# Patient Record
Sex: FEMALE | Race: WHITE | NOT HISPANIC OR LATINO | Employment: STUDENT | URBAN - NONMETROPOLITAN AREA
[De-identification: names, ages, dates, MRNs, and addresses within clinical notes are randomized per-mention and may not be internally consistent; named-entity substitution may affect disease eponyms.]

---
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
## 2020-01-22 ENCOUNTER — OFFICE VISIT (OUTPATIENT)
Dept: PSYCHIATRY | Facility: CLINIC | Age: 20
End: 2020-01-22

## 2020-01-22 VITALS
BODY MASS INDEX: 21.49 KG/M2 | WEIGHT: 129 LBS | SYSTOLIC BLOOD PRESSURE: 103 MMHG | DIASTOLIC BLOOD PRESSURE: 66 MMHG | HEART RATE: 78 BPM | HEIGHT: 65 IN

## 2020-01-22 DIAGNOSIS — G47.9 SLEEPING DIFFICULTY: ICD-10-CM

## 2020-01-22 DIAGNOSIS — F43.12 CHRONIC POST-TRAUMATIC STRESS DISORDER (PTSD): ICD-10-CM

## 2020-01-22 DIAGNOSIS — F41.1 GENERALIZED ANXIETY DISORDER: ICD-10-CM

## 2020-01-22 DIAGNOSIS — F60.3 BORDERLINE PERSONALITY DISORDER (HCC): ICD-10-CM

## 2020-01-22 DIAGNOSIS — F33.0 MILD EPISODE OF RECURRENT MAJOR DEPRESSIVE DISORDER (HCC): Primary | ICD-10-CM

## 2020-01-22 PROCEDURE — 99214 OFFICE O/P EST MOD 30 MIN: CPT | Performed by: NURSE PRACTITIONER

## 2020-01-22 RX ORDER — LAMOTRIGINE 25 MG/1
25 TABLET ORAL DAILY
Qty: 30 TABLET | Refills: 0 | Status: SHIPPED | OUTPATIENT
Start: 2020-01-22 | End: 2020-02-12 | Stop reason: SDUPTHER

## 2020-01-22 RX ORDER — PRAZOSIN HYDROCHLORIDE 2 MG/1
2 CAPSULE ORAL NIGHTLY
Qty: 30 CAPSULE | Refills: 0 | Status: SHIPPED | OUTPATIENT
Start: 2020-01-22 | End: 2020-02-12 | Stop reason: SDUPTHER

## 2020-01-22 RX ORDER — BUSPIRONE HYDROCHLORIDE 10 MG/1
10 TABLET ORAL 2 TIMES DAILY
Qty: 60 TABLET | Refills: 0 | Status: SHIPPED | OUTPATIENT
Start: 2020-01-22 | End: 2020-02-12 | Stop reason: SDUPTHER

## 2020-01-22 NOTE — PROGRESS NOTES
"Subjective   Samantha Dubose is a 19 y.o. female who is here today for medication management follow up.    Chief Complaint: Anxiety and depression     History of Present Illness   Patient prefers to be called \"Michaela\". Presents today with her mother stating that she is still having difficulty with sleep.  When inquiring more about her sleep schedule patient states that she stays up all night watching TV Netflix and then will sleep 8 9 hours during the day in which she sleeps well.  Mother stated that she is tried multiple things to get her on a better sleep cycle as she has not been helpful for her doing chores around the home.  Suggested to mother and patient that she find a job working night shift so that way she can feel productive but when she is not having to get up in the mornings and do anything the sleep schedule can be altered.  Discussed proper sleep hygiene in detail.  Patient does not wish to elaborate on \"stuff\" that has been going on with her that has worsened her symptoms of anxiety and depression as well as irritability.  Patient's mother notes that she has had some depression and anxiety but she noticed it started getting worse in the last few weeks when she stated they were going to have to move and downsize that her home.  Patient states that she understands they have to move but does not want to. The patient reports depressive symptoms including depressed mood, crying spells, decreased appetite, anhedonia, feelings of guilt, feelings of hopelessness, low energy and difficulty concentrating, and have caused impairment in important areas of functioning.  Depression rated 7/10 with 10 being the worst. The patient reports the following symptoms of anxiety: constant anxiety/worry, restlessness/on edge, difficulty concentrating, irritability and anxiety causes distress/impairment in important areas of functioning and have caused impairment in important areas of functioning.  Patient reports her " anxiety has been a 7-8 on a scale 0-10 with 10 being the worst.  Mom states she has noticed that her mood has been fluctuating often.  Encouraged both of them that this is why we need medication to help with mood fluctuation as well as depression getting significantly high.  Patient also notes that she has not been taking the BuSpar on a regular schedule as she only takes as needed encouraged her that it would not work this way she has to take at least twice a day to keep her anxiety lower.  Patient denied any side effects to the current medication.  Patient states when she does sleep but she denies any nightmares.  Mother states that they did not follow-up with the PCP regarding lab work or sleep study encouraged them that she most likely did not need a sleep study more so it was her sleep routine and pattern.  Patient denied any SI or HI.  Patient denied any auditory or visual hallucinations.  Patient admits to thoughts of self-harm but denies any.  She reports that she is still going to see Deon for therapy which is helpful and plans on discussing some of her issues with him.        The following portions of the patient's history were reviewed and updated as appropriate: allergies, current medications, past family history, past medical history, past social history, past surgical history and problem list.    Review of Systems   Constitutional: Positive for appetite change.   Neurological: Negative for dizziness.   Psychiatric/Behavioral: Positive for agitation and dysphoric mood. Negative for behavioral problems, self-injury and sleep disturbance. The patient is nervous/anxious.        Objective   Physical Exam   Constitutional: She appears well-developed and well-nourished.   Psychiatric: Her speech is normal. Thought content normal. Her mood appears anxious. Her affect is not angry. She is agitated. Cognition and memory are normal. She expresses impulsivity. She exhibits a depressed mood. She is attentive.  "  Vitals reviewed.    Blood pressure 103/66, pulse 78, height 165.1 cm (65\"), weight 58.5 kg (129 lb). Body mass index is 21.47 kg/m².      No Known Allergies    Current Medications:   Current Outpatient Medications   Medication Sig Dispense Refill   • busPIRone (BUSPAR) 10 MG tablet Take 1 tablet by mouth 2 (Two) Times a Day. 60 tablet 0   • levonorgestrel-ethinyl estradiol (LESSINA) 0.1-20 MG-MCG per tablet Take 1 tablet by mouth Daily.     • prazosin (MINIPRESS) 2 MG capsule Take 1 capsule by mouth Every Night. 30 capsule 0   • lamoTRIgine (LAMICTAL) 25 MG tablet Take 1 tablet by mouth Daily. 30 tablet 0     No current facility-administered medications for this visit.        Mental Status Exam:   Hygiene:   good  Cooperation: Cooperative  Eye Contact: Good  Psychomotor Behavior: Agitated   Affect:  Full range  Hopelessness: Denies  Speech:  Normal  Thought Process:  Goal directed  Thought Content:  Mood congruent   Suicidal:  None  Homicidal:  None  Hallucinations:  None  Delusion:  None  Memory:  Intact  Orientation:  Person, Place, Time and Situation  Reliability:  fair  Insight:  Fair  Judgement:  Fair  Impulse Control:  Fair  Physical/Medical Issues:  No     Assessment/Plan   Diagnoses and all orders for this visit:    Mild episode of recurrent major depressive disorder (CMS/HCC)  -     lamoTRIgine (LAMICTAL) 25 MG tablet; Take 1 tablet by mouth Daily.    Generalized anxiety disorder  -     busPIRone (BUSPAR) 10 MG tablet; Take 1 tablet by mouth 2 (Two) Times a Day.  -     lamoTRIgine (LAMICTAL) 25 MG tablet; Take 1 tablet by mouth Daily.    Sleeping difficulty    Chronic post-traumatic stress disorder (PTSD)  -     prazosin (MINIPRESS) 2 MG capsule; Take 1 capsule by mouth Every Night.  -     lamoTRIgine (LAMICTAL) 25 MG tablet; Take 1 tablet by mouth Daily.    Borderline personality disorder (CMS/HCC)  -     lamoTRIgine (LAMICTAL) 25 MG tablet; Take 1 tablet by mouth Daily.        I spent a total of   " 25 minutes in direct patient care, greater than  15 minutes (greater than 50%) were spent in coordination of care, and counseling the patient regarding depression and anxiety as well as the importance of medication adherence. Answered any questions patient had with medication and plan.   Discussed medications options as well as any side effects in detail with the patient and mother.  Also discussed proper sleep hygiene and sleeping habits in detail with the patient.  Encouraged mom to follow-up with PCP for a checkup as well as lab work even though the patient refuses.    Patient stated that she was willing to try medication for her fluctuation in mood and depression.  Begin lamotrigine 25 mg daily for adjunct for depression as well as mood fluctuation. Continue Minipress 2 mg at night for nightmares.  Continue BuSpar 10 mg twice daily for anxiety, highly encouraged the patient that she will have to take twice a day to note effectiveness.  Discussed the risks, beneefits, and side effects of the medications; patient ackowledged and verbally consented.  Patient is aware to call the [facility] with any worsening of symptoms.  Patient is agreeable to call 911 or go to the nearest ER should he/she begin having SI/HI. Patient was strongly encouraged to continue birth control.  Patient was counseled regarding need not to become pregnant prior to discussion and possible titration and discontinuation of medications.  An explanation was provided to the patient regarding the risk of fetal harm with psychotrophic medications.  Patient was provided education regarding both risk of continuing and discontinuing medications during pregnancy.  Patient verbalized understanding. We will add Lamictal in an effort to stabilize mood.  The patient was reminded to immediately come to the hospital should there be any loss of control.  Explanation was given to her regarding Lamictal and the potential for Dylan Stoney syndrome and  significant rash.  Patient was encouraged to check skin prior to beginning.  Patient was encouraged to report any rash and to immediately stop medication.      Prognosis: Guarded dependent on medication/follow up and treatment plan compliance.   Functionality: pt having significant impairment in important areas of daily functioning regarding depression and anxiety will adjust medications and add additional medications and follow over the next 3 to 4 weeks.  Patient will follow-up in 3-4 weeks, encourage patient and the mother that if they had any questions or concerns or worsening depressive symptoms to contact the [facility] for sooner appointment both agreed.    Errors in dictation may reflect use of voice recognition software and not all errors in transcription may have been detected prior to signing.

## 2020-02-03 DIAGNOSIS — F41.1 GENERALIZED ANXIETY DISORDER: ICD-10-CM

## 2020-02-03 RX ORDER — BUSPIRONE HYDROCHLORIDE 10 MG/1
TABLET ORAL
Qty: 60 TABLET | Refills: 1 | OUTPATIENT
Start: 2020-02-03

## 2020-02-12 ENCOUNTER — OFFICE VISIT (OUTPATIENT)
Dept: PSYCHIATRY | Facility: CLINIC | Age: 20
End: 2020-02-12

## 2020-02-12 VITALS
HEART RATE: 82 BPM | BODY MASS INDEX: 21.56 KG/M2 | SYSTOLIC BLOOD PRESSURE: 112 MMHG | WEIGHT: 129.4 LBS | HEIGHT: 65 IN | DIASTOLIC BLOOD PRESSURE: 77 MMHG

## 2020-02-12 DIAGNOSIS — F43.12 CHRONIC POST-TRAUMATIC STRESS DISORDER (PTSD): ICD-10-CM

## 2020-02-12 DIAGNOSIS — F33.0 MILD EPISODE OF RECURRENT MAJOR DEPRESSIVE DISORDER (HCC): ICD-10-CM

## 2020-02-12 DIAGNOSIS — F60.3 BORDERLINE PERSONALITY DISORDER (HCC): ICD-10-CM

## 2020-02-12 DIAGNOSIS — F41.1 GENERALIZED ANXIETY DISORDER: ICD-10-CM

## 2020-02-12 PROCEDURE — 99213 OFFICE O/P EST LOW 20 MIN: CPT | Performed by: NURSE PRACTITIONER

## 2020-02-12 RX ORDER — BUSPIRONE HYDROCHLORIDE 10 MG/1
10 TABLET ORAL 2 TIMES DAILY
Qty: 60 TABLET | Refills: 2 | Status: SHIPPED | OUTPATIENT
Start: 2020-02-12 | End: 2020-03-11 | Stop reason: SDUPTHER

## 2020-02-12 RX ORDER — PRAZOSIN HYDROCHLORIDE 2 MG/1
2 CAPSULE ORAL NIGHTLY
Qty: 30 CAPSULE | Refills: 2 | Status: SHIPPED | OUTPATIENT
Start: 2020-02-12 | End: 2020-03-11 | Stop reason: SDUPTHER

## 2020-02-12 RX ORDER — LAMOTRIGINE 25 MG/1
25 TABLET ORAL DAILY
Qty: 30 TABLET | Refills: 2 | Status: SHIPPED | OUTPATIENT
Start: 2020-02-12 | End: 2020-03-11 | Stop reason: SDUPTHER

## 2020-03-11 ENCOUNTER — OFFICE VISIT (OUTPATIENT)
Dept: PSYCHIATRY | Facility: CLINIC | Age: 20
End: 2020-03-11

## 2020-03-11 VITALS
DIASTOLIC BLOOD PRESSURE: 75 MMHG | WEIGHT: 139 LBS | HEART RATE: 96 BPM | SYSTOLIC BLOOD PRESSURE: 110 MMHG | HEIGHT: 65 IN | BODY MASS INDEX: 23.16 KG/M2

## 2020-03-11 DIAGNOSIS — Z79.899 MEDICATION MANAGEMENT: ICD-10-CM

## 2020-03-11 DIAGNOSIS — F33.0 MILD EPISODE OF RECURRENT MAJOR DEPRESSIVE DISORDER (HCC): ICD-10-CM

## 2020-03-11 DIAGNOSIS — F60.3 BORDERLINE PERSONALITY DISORDER (HCC): ICD-10-CM

## 2020-03-11 DIAGNOSIS — F43.12 CHRONIC POST-TRAUMATIC STRESS DISORDER (PTSD): ICD-10-CM

## 2020-03-11 DIAGNOSIS — F41.1 GENERALIZED ANXIETY DISORDER: Primary | ICD-10-CM

## 2020-03-11 DIAGNOSIS — G47.9 SLEEPING DIFFICULTY: ICD-10-CM

## 2020-03-11 LAB
AMPHETAMINE CUT-OFF: NORMAL
BENZODIAZIPINE CUT-OFF: NORMAL
BUPRENORPHINE CUT-OFF: NORMAL
COCAINE CUT-OFF: NORMAL
EXTERNAL AMPHETAMINE SCREEN URINE: NEGATIVE
EXTERNAL BENZODIAZEPINE SCREEN URINE: NEGATIVE
EXTERNAL BUPRENORPHINE SCREEN URINE: NEGATIVE
EXTERNAL COCAINE SCREEN URINE: NEGATIVE
EXTERNAL MDMA: NEGATIVE
EXTERNAL METHADONE SCREEN URINE: NEGATIVE
EXTERNAL METHAMPHETAMINE SCREEN URINE: NEGATIVE
EXTERNAL OPIATES SCREEN URINE: NEGATIVE
EXTERNAL OXYCODONE SCREEN URINE: NEGATIVE
EXTERNAL THC SCREEN URINE: NEGATIVE
MDMA CUT-OFF: NORMAL
METHADONE CUT-OFF: NORMAL
METHAMPHETAMINE CUT-OFF: NORMAL
OPIATES CUT-OFF: NORMAL
OXYCODONE CUT-OFF: NORMAL
THC CUT-OFF: NORMAL

## 2020-03-11 PROCEDURE — 90792 PSYCH DIAG EVAL W/MED SRVCS: CPT | Performed by: NURSE PRACTITIONER

## 2020-03-11 RX ORDER — TRAZODONE HYDROCHLORIDE 50 MG/1
50 TABLET ORAL NIGHTLY PRN
Qty: 30 TABLET | Refills: 2 | Status: SHIPPED | OUTPATIENT
Start: 2020-03-11 | End: 2020-04-30

## 2020-03-11 RX ORDER — HYDROXYZINE PAMOATE 50 MG/1
50 CAPSULE ORAL NIGHTLY PRN
Qty: 30 CAPSULE | Refills: 2 | Status: SHIPPED | OUTPATIENT
Start: 2020-03-11 | End: 2020-04-30

## 2020-03-11 RX ORDER — PRAZOSIN HYDROCHLORIDE 2 MG/1
2 CAPSULE ORAL NIGHTLY
Qty: 30 CAPSULE | Refills: 2 | Status: SHIPPED | OUTPATIENT
Start: 2020-03-11 | End: 2020-05-14

## 2020-03-11 RX ORDER — LAMOTRIGINE 25 MG/1
25 TABLET ORAL DAILY
Qty: 30 TABLET | Refills: 2 | Status: SHIPPED | OUTPATIENT
Start: 2020-03-11 | End: 2020-04-30 | Stop reason: SDUPTHER

## 2020-03-11 RX ORDER — BUSPIRONE HYDROCHLORIDE 15 MG/1
15 TABLET ORAL 2 TIMES DAILY
Qty: 60 TABLET | Refills: 2 | Status: SHIPPED | OUTPATIENT
Start: 2020-03-11 | End: 2020-04-30 | Stop reason: SDUPTHER
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
## 2020-04-30 ENCOUNTER — TELEMEDICINE (OUTPATIENT)
Dept: PSYCHIATRY | Facility: CLINIC | Age: 20
End: 2020-04-30

## 2020-04-30 DIAGNOSIS — F33.0 MILD EPISODE OF RECURRENT MAJOR DEPRESSIVE DISORDER (HCC): ICD-10-CM

## 2020-04-30 DIAGNOSIS — F41.1 GENERALIZED ANXIETY DISORDER: Primary | ICD-10-CM

## 2020-04-30 DIAGNOSIS — F43.12 CHRONIC POST-TRAUMATIC STRESS DISORDER (PTSD): ICD-10-CM

## 2020-04-30 DIAGNOSIS — F60.3 BORDERLINE PERSONALITY DISORDER (HCC): ICD-10-CM

## 2020-04-30 PROCEDURE — 99214 OFFICE O/P EST MOD 30 MIN: CPT | Performed by: NURSE PRACTITIONER

## 2020-04-30 RX ORDER — LAMOTRIGINE 25 MG/1
50 TABLET ORAL DAILY
Qty: 60 TABLET | Refills: 1 | Status: SHIPPED | OUTPATIENT
Start: 2020-04-30 | End: 2020-05-28 | Stop reason: SDUPTHER

## 2020-04-30 RX ORDER — BUSPIRONE HYDROCHLORIDE 10 MG/1
10 TABLET ORAL 2 TIMES DAILY
Qty: 60 TABLET | Refills: 1 | Status: SHIPPED | OUTPATIENT
Start: 2020-04-30 | End: 2020-05-28 | Stop reason: SDUPTHER

## 2020-04-30 NOTE — TREATMENT PLAN
Multi-Disciplinary Problems (from Behavioral Health Treatment Plan)    Active Problems     Problem: Anxiety  Start Date: 04/30/20    Problem Details:  The patient self-scales this problem as a 5 with 10 being the worst.       Goal Priority Start Date Expected End Date End Date    Patient will develop and implement behavioral and cognitive strategies to reduce anxiety and irrational fears. -- 04/30/20 -- --    Goal Details:  Progress toward goal:  The patient self-scales their progress related to this goal as a 5 with 10 being the worst.       Goal Intervention Frequency Start Date End Date    Help patient explore past emotional issues in relation to present anxiety. Weekly 04/30/20 --    Intervention Details:  Duration of treatment until until remission of symptoms.       Goal Intervention Frequency Start Date End Date    Help patient develop an awareness of their cognitive and physical responses to anxiety. Q Month 04/30/20 --    Intervention Details:  Duration of treatment until until remission of symptoms.             Problem: Depression  Start Date: 04/30/20    Problem Details:  The patient self-scales this problem as a 0 with 10 being the worst.       Goal Priority Start Date Expected End Date End Date    Patient will demonstrate the ability to initiate new constructive life skills outside of sessions on a consistent basis. -- 04/30/20 -- --    Goal Details:  Progress toward goal:  The patient self-scales their progress related to this goal as a 0 with 10 being the worst.       Goal Intervention Frequency Start Date End Date    Assist patient in setting attainable activities of daily living goals. PRN 04/30/20 --    Goal Intervention Frequency Start Date End Date    Provide education about depression Weekly 04/30/20 --    Intervention Details:  Duration of treatment until until discharged.       Goal Intervention Frequency Start Date End Date    Assist patient in developing healthy coping strategies. Weekly  04/30/20 --    Intervention Details:  Duration of treatment until until discharged.                          I have discussed and reviewed this treatment plan with the patient.

## 2020-04-30 NOTE — PROGRESS NOTES
Minerva Dubose is a 20 y.o. female who is being seen for medication management today.     This provider is located at [place] [address]. The Patient is seen remotely at home, using [employer]. Patient is being seen via telehealth and confirm that they are in a secure environment for this session. The patient's condition being diagnosed/treated is appropriate for telemedicine. The provider identified himself/herself: herself as well as her credentials.   The mother and patient gave consent to be seen remotely, and when consent is given they understand that the consent allows for patient identifiable information to be sent to a third party as needed.   They may refuse to be seen remotely at any time. The electronic data is encrypted and password protected, and the patient has been advised of the potential risks to privacy not withstanding such measures.    You have chosen to receive care through a telehealth visit.  Do you consent to use a video/audio connection for your medical care today? Yes        Chief Complaint: follow-up for depression anxiety and depression     History of Present Illness   Patient is being seen via my chart with her mother which she has consented and agreed with.  Patient states that she had been doing well until her last visit with another provider and she was placed on trazodone and hydroxyzine and started having hallucinations after a couple doses so discontinued them both but the hallucinations are still present.  Patient also states that her BuSpar was increased to 15 mg twice daily and she is unable to tolerate as it makes her heart racing out for early anxious so she can only take 1 dose but it does not help with her nighttime anxiety just during the day she cannot handle more than the 15 mg in 1 day.  Patient states that she has stopped the trazodone and hydroxyzine and is taking Percogestic which is a generic form of ZzzQuil which seems  to be helping with her sleep and contains acetaminophen and Benadryl.  Caution patient regarding overusing Tylenol and worried about her liver function and advised her not to take every night only as needed.  Patient denied any depression.  She states that on April 24 she got saved and plans on attending [employer] regularly as soon as the coronavirus is over with.  Patient states that her anxiety has been roughly a 4-5 and up and down at times on a scale 0-10 with 10 being the worst due to the fact that she is having hallucinations.  Patient states that she sees shadows as well as hears voices and they are muffled at times.  Patient states at times she is able to realize they are not real but other times she has to ask her mom due to that shadows being vivid for her according to the patient.  Patient notes that if it was not for the hallucinations she believes her anxiety would be slightly better and she would be doing well.  Patient reports that her mood has been good and she has not had any issues with irritability or agitation.  Patient notes that her appetite has been good.  Patient states that since taking the new medication for sleep she has been sleeping well with no issues.  Patient denies any self-harm or cutting.  Patient denies any SI.  Patient admits to auditory and visual hallucinations but states that they are not telling her to harm herself or anyone else.      The following portions of the patient's history were reviewed and updated as appropriate: allergies, current medications, past family history, past medical history, past social history, past surgical history and problem list.    Review of Systems   Constitutional: Negative for appetite change.   Neurological: Negative for dizziness.   Psychiatric/Behavioral: Positive for hallucinations. Negative for agitation, behavioral problems, dysphoric mood, self-injury and sleep disturbance. The patient is not nervous/anxious.        Objective   Physical  Exam   Constitutional: She appears well-developed and well-nourished.   Psychiatric: Her speech is normal and behavior is normal. Judgment and thought content normal. Her mood appears anxious. Her affect is not angry. She is actively hallucinating. She is not agitated. Cognition and memory are normal. She does not express impulsivity. She does not exhibit a depressed mood. She is attentive.     There were no vitals taken for this visit. There is no height or weight on file to calculate BMI.  Due to extenuating circumstances and possible current health risks associated with the patient being present in a clinical setting (with current health restrictions in place in regards to possible COVID 19 transmission/exposure), the patient was seen remotely today via a [employer] Video Visit through [employer].  Unable to obtain vital signs due to nature of remote visit.  Height stated at 65 inches.  Weight stated at 139 pounds.      No Known Allergies    Current Medications:   Current Outpatient Medications   Medication Sig Dispense Refill   • busPIRone (BUSPAR) 10 MG tablet Take 1 tablet by mouth 2 (Two) Times a Day. 60 tablet 1   • lamoTRIgine (LaMICtal) 25 MG tablet Take 2 tablets by mouth Daily. 60 tablet 1   • levonorgestrel-ethinyl estradiol (LESSINA) 0.1-20 MG-MCG per tablet Take 1 tablet by mouth Daily.     • prazosin (MINIPRESS) 2 MG capsule Take 1 capsule by mouth Every Night. 30 capsule 2     No current facility-administered medications for this visit.        Mental Status Exam:   Hygiene:   good  Cooperation: Cooperative  Eye Contact: Good  Psychomotor Behavior: Appropriate   Affect:  Full range  Hopelessness: Denies  Speech:  Normal  Thought Process:  Goal directed  Thought Content:  Mood congruent   Suicidal:  None  Homicidal:  None  Hallucinations:  Auditory and Visual   Delusion:  None  Memory:  Intact  Orientation:  Person, Place, Time and Situation  Reliability:  Good   Insight:  Fair  Judgement:  Fair  Impulse  Control:  Fair  Physical/Medical Issues:  No     Assessment/Plan   Diagnoses and all orders for this visit:    Generalized anxiety disorder  -     busPIRone (BUSPAR) 10 MG tablet; Take 1 tablet by mouth 2 (Two) Times a Day.  -     lamoTRIgine (LaMICtal) 25 MG tablet; Take 2 tablets by mouth Daily.    Chronic post-traumatic stress disorder (PTSD)  -     busPIRone (BUSPAR) 10 MG tablet; Take 1 tablet by mouth 2 (Two) Times a Day.  -     lamoTRIgine (LaMICtal) 25 MG tablet; Take 2 tablets by mouth Daily.    Mild episode of recurrent major depressive disorder (CMS/HCC)  -     lamoTRIgine (LaMICtal) 25 MG tablet; Take 2 tablets by mouth Daily.    Borderline personality disorder (CMS/HCC)  -     lamoTRIgine (LaMICtal) 25 MG tablet; Take 2 tablets by mouth Daily.        I spent a total of   25 minutes in direct patient care, greater than  15 minutes (greater than 50%) were spent in coordination of care, and counseling the patient regarding anxiety and hallucinations. Answered any questions patient had with medication and plan.  Patient denies any side effects or rash to the current medications.  Patient's only complaint at this time was her anxiety and hallucinations.  Praised patient for eating well as well as being active with an improved mood as mother notes she is doing well.      Increase lamotrigine to 50 mg daily for worsening hallucinations as well as depression and BPD.  Decrease BuSpar back to 10 mg twice a day as patient cannot tolerate at 15 mg twice a day as it was making her too anxious.  Continue Minipress 2 mg at night for nightmares. Advised patient to only take OTC sleep aides at night as needed due to avoiding excess amounts of Tylenol.  Discussed the risks, beneefits, and side effects of the medications; patient ackowledged and verbally consented.  Patient is aware to call the clinic with any worsening of symptoms.  Patient is agreeable to call 911 or go to the nearest ER should he/she begin having  SI/HI. Patient was strongly encouraged to continue birth control.  Patient was counseled regarding need not to become pregnant prior to discussion and possible titration and discontinuation of medications.  An explanation was provided to the patient regarding the risk of fetal harm with psychotrophic medications.  Patient was provided education regarding both risk of continuing and discontinuing medications during pregnancy.  Patient verbalized understanding. We will increase Lamictal in an effort to stabilize mood.  The patient was reminded to immediately come to the hospital should there be any loss of control.  Explanation was given to her regarding Lamictal and the potential for Dylan Stoney syndrome and significant rash.  Patient was encouraged to check skin prior to beginning.  Patient was encouraged to report any rash and to immediately stop medication.      Treatment Plan  Prognosis: Guarded dependent on medication/follow up and treatment plan compliance.   Functionality: pt showing improvements in important areas of daily functioning regarding her mood but struggling with hallucinations and anxiety will adjust medications and follow-up over the next weeks.   Patient will follow-up in 4 weeks, encourage patient and the mother that if they had any questions or concerns or worsening symptoms to contact the clinic both verbalized and agreed.     Short-term goals: Patient will adhere to medication regimen above and note a decrease in hallucinations over the next 4 weeks.  Long-term goals: Patient will continue with medication regimen and therapy and note an improvement in symptoms and daily functioning over the next 6 months.    Errors in dictation may reflect use of voice recognition software and not all errors in transcription may have been detected prior to signing.

## 2020-05-14 DIAGNOSIS — F43.12 CHRONIC POST-TRAUMATIC STRESS DISORDER (PTSD): ICD-10-CM

## 2020-05-14 RX ORDER — PRAZOSIN HYDROCHLORIDE 2 MG/1
CAPSULE ORAL
Qty: 30 CAPSULE | Refills: 2 | Status: SHIPPED | OUTPATIENT
Start: 2020-05-14 | End: 2020-05-28 | Stop reason: SDUPTHER

## 2020-05-28 ENCOUNTER — TELEMEDICINE (OUTPATIENT)
Dept: PSYCHIATRY | Facility: CLINIC | Age: 20
End: 2020-05-28

## 2020-05-28 DIAGNOSIS — F43.12 CHRONIC POST-TRAUMATIC STRESS DISORDER (PTSD): ICD-10-CM

## 2020-05-28 DIAGNOSIS — F41.1 GENERALIZED ANXIETY DISORDER: ICD-10-CM

## 2020-05-28 DIAGNOSIS — F33.0 MILD EPISODE OF RECURRENT MAJOR DEPRESSIVE DISORDER (HCC): ICD-10-CM

## 2020-05-28 DIAGNOSIS — F60.3 BORDERLINE PERSONALITY DISORDER (HCC): ICD-10-CM

## 2020-05-28 PROCEDURE — 99213 OFFICE O/P EST LOW 20 MIN: CPT | Performed by: NURSE PRACTITIONER

## 2020-05-28 RX ORDER — BUSPIRONE HYDROCHLORIDE 10 MG/1
10 TABLET ORAL 3 TIMES DAILY
Qty: 90 TABLET | Refills: 0 | Status: SHIPPED | OUTPATIENT
Start: 2020-05-28 | End: 2020-06-25 | Stop reason: SDUPTHER

## 2020-05-28 RX ORDER — PRAZOSIN HYDROCHLORIDE 2 MG/1
2 CAPSULE ORAL NIGHTLY
Qty: 30 CAPSULE | Refills: 0 | Status: SHIPPED | OUTPATIENT
Start: 2020-05-28 | End: 2020-06-25 | Stop reason: SDUPTHER

## 2020-05-28 RX ORDER — LAMOTRIGINE 25 MG/1
50 TABLET ORAL DAILY
Qty: 60 TABLET | Refills: 1 | Status: SHIPPED | OUTPATIENT
Start: 2020-05-28 | End: 2020-06-25 | Stop reason: SDUPTHER

## 2020-05-28 NOTE — PROGRESS NOTES
Minerva Dubose is a 20 y.o. female who is being seen for medication management today.     This provider is located at [address] [place] [address]. The Patient is seen remotely at home [address] [place] [address], using [employer]. Patient is being seen via telehealth and confirm that they are in a secure environment for this session. The patient's condition being diagnosed/treated is appropriate for telemedicine. The provider identified himself/herself: herself as well as her credentials.   The mother and patient gave consent to be seen remotely, and when consent is given they understand that the consent allows for patient identifiable information to be sent to a third party as needed.   They may refuse to be seen remotely at any time. The electronic data is encrypted and password protected, and the patient has been advised of the potential risks to privacy not withstanding such measures.    You have chosen to receive care through a telehealth visit.  Do you consent to use a video/audio connection for your medical care today? Yes        Chief Complaint: follow-up for hallucinations and sleep     History of Present Illness   Patient presents today for her my chart visit noting that the hallucinations have gone away as she is not having any more auditory she states occasionally her mind plays tricks on her visually but those are started going away as well.  Patient denies any side effects or rash to the current medications.  Patient states that she has been a little sad around the time of her menstrual cycle but other than that she has been doing well.  Patient noticed that she has been slightly anxious but stated that she had a fight with her older brother so that has been a little difficult.  Patient also notes that she has had more difficulty with sleep as she ran out of sleeping medicine and is going to try ZzzQuil which was effective for her in the past.   Patient states that some of the anxiety could be affecting her sleep as well as she has been taking them.  Patient denies any self-harm.  Patient does report dizziness on sporadic occasions encourage patient that this could be from the Minipress to make it stop if desired as patient stated she wanted to continue due to the fact that it does help with her nightmares.  Patient states that she is only getting roughly 6 or 7 hours of sleep at night she will go to sleep at 330 and wake up around 11 AM.  Patient denies any other significant issues.  Patient denies any SI or HI.  Patient denies any auditory or visual hallucinations.        The following portions of the patient's history were reviewed and updated as appropriate: allergies, current medications, past family history, past medical history, past social history, past surgical history and problem list.    Review of Systems   Constitutional: Negative for appetite change.   Neurological: Negative for dizziness.   Psychiatric/Behavioral: Positive for sleep disturbance. Negative for agitation, behavioral problems, dysphoric mood, hallucinations and self-injury. The patient is nervous/anxious.        Objective   Physical Exam   Constitutional: She appears well-developed and well-nourished.   Psychiatric: Her speech is normal and behavior is normal. Judgment and thought content normal. Her mood appears anxious. Her affect is not angry. She is not agitated and not actively hallucinating. Cognition and memory are normal. She does not express impulsivity. She does not exhibit a depressed mood. She is attentive.     There were no vitals taken for this visit. There is no height or weight on file to calculate BMI.  Due to extenuating circumstances and possible current health risks associated with the patient being present in a clinical setting (with current health restrictions in place in regards to possible COVID 19 transmission/exposure), the patient was seen remotely today  via a [employer] Video Visit through [employer].  Unable to obtain vital signs due to nature of remote visit.  Height stated at 65 inches.  Weight stated at 139 pounds.      No Known Allergies    Current Medications:   Current Outpatient Medications   Medication Sig Dispense Refill   • busPIRone (BUSPAR) 10 MG tablet Take 1 tablet by mouth 3 (Three) Times a Day. 90 tablet 0   • lamoTRIgine (LaMICtal) 25 MG tablet Take 2 tablets by mouth Daily. 60 tablet 1   • levonorgestrel-ethinyl estradiol (LESSINA) 0.1-20 MG-MCG per tablet Take 1 tablet by mouth Daily.     • prazosin (MINIPRESS) 2 MG capsule Take 1 capsule by mouth Every Night. 30 capsule 0     No current facility-administered medications for this visit.        Mental Status Exam:   Hygiene:   good  Cooperation: Cooperative  Eye Contact: Good  Psychomotor Behavior: Appropriate   Affect:  Full range  Hopelessness: Denies  Speech:  Normal  Thought Process:  Goal directed  Thought Content:  Mood congruent   Suicidal:  None  Homicidal:  None  Hallucinations:  None  Delusion:  None  Memory:  Intact  Orientation:  Person, Place, Time and Situation  Reliability:  Good   Insight:  Fair  Judgement:  Fair  Impulse Control:  Fair  Physical/Medical Issues:  No     Assessment/Plan   Diagnoses and all orders for this visit:    Generalized anxiety disorder  -     busPIRone (BUSPAR) 10 MG tablet; Take 1 tablet by mouth 3 (Three) Times a Day.  -     lamoTRIgine (LaMICtal) 25 MG tablet; Take 2 tablets by mouth Daily.    Chronic post-traumatic stress disorder (PTSD)  -     busPIRone (BUSPAR) 10 MG tablet; Take 1 tablet by mouth 3 (Three) Times a Day.  -     lamoTRIgine (LaMICtal) 25 MG tablet; Take 2 tablets by mouth Daily.  -     prazosin (MINIPRESS) 2 MG capsule; Take 1 capsule by mouth Every Night.    Mild episode of recurrent major depressive disorder (CMS/HCC)  -     lamoTRIgine (LaMICtal) 25 MG tablet; Take 2 tablets by mouth Daily.    Borderline personality disorder (CMS/HCC)  -      lamoTRIgine (LaMICtal) 25 MG tablet; Take 2 tablets by mouth Daily.      Discussed medications and any side effects as well as risk.  Answered any questions patient had with medication and plan.  Patient denies any side effects or rash to the current medications.  Patient's only complaint at this time was her anxiety and sleep.  Encourage patient to obtain her ZzzQuil as well as take melatonin 3 to 5 mg at night with the Seroquel to help with her sleep.      Continue lamotrigine to 50 mg daily for worsening hallucinations as well as depression and BPD.  Encouraged the patient that she can take 10 mg twice a day for anxiety that if in the middle of the day her anxiety is increased she can take 10mg, if tolerated.  Continue Minipress 2 mg at night for nightmares. Advised patient to only take OTC sleep aides at night as needed due to avoiding excess amounts of Tylenol.  Encourage patient that if the sleep aids were not effective or she had any worsening symptoms contact the clinic but also encourage proper sleep hygiene.  Form patient that if Minipress was causing the dizziness then we can discontinue but patient denied wanting to discontinue at this time given its effectiveness.  Discussed the risks, beneefits, and side effects of the medications; patient ackowledged and verbally consented.  Patient is aware to call the clinic with any worsening of symptoms.  Patient is agreeable to call 911 or go to the nearest ER should he/she begin having SI/HI. Patient was strongly encouraged to continue birth control.  Patient was counseled regarding need not to become pregnant prior to discussion and possible titration and discontinuation of medications.  An explanation was provided to the patient regarding the risk of fetal harm with psychotrophic medications.  Patient was provided education regarding both risk of continuing and discontinuing medications during pregnancy.  Patient verbalized understanding. We will continue  Lamictal in an effort to stabilize mood.  The patient was reminded to immediately come to the hospital should there be any loss of control.  Explanation was given to her regarding Lamictal and the potential for Dylan Stoney syndrome and significant rash.  Patient was encouraged to check skin prior to beginning.  Patient was encouraged to report any rash and to immediately stop medication.      Treatment Plan  Prognosis: Guarded dependent on medication/follow up and treatment plan compliance.   Functionality: pt showing improvements in important areas of daily functioning regarding her mood but struggling with sleep and anxiety will adjust medications and follow-up over the next 4 weeks.   Patient will follow-up in 4 weeks, encourage patient and the mother that if they had any questions or concerns or worsening symptoms to contact the clinic both verbalized and agreed.     Short-term goals: Patient will adhere to medication regimen above and note a decrease in anxiety over the next 4 weeks.  Long-term goals: Patient will continue with medication regimen and therapy and note an improvement in symptoms and daily functioning over the next 6 months.    Errors in dictation may reflect use of voice recognition software and not all errors in transcription may have been detected prior to signing.

## 2020-06-25 ENCOUNTER — TELEMEDICINE (OUTPATIENT)
Dept: PSYCHIATRY | Facility: CLINIC | Age: 20
End: 2020-06-25

## 2020-06-25 DIAGNOSIS — F33.0 MILD EPISODE OF RECURRENT MAJOR DEPRESSIVE DISORDER (HCC): ICD-10-CM

## 2020-06-25 DIAGNOSIS — G47.9 SLEEPING DIFFICULTY: ICD-10-CM

## 2020-06-25 DIAGNOSIS — F41.1 GENERALIZED ANXIETY DISORDER: Primary | ICD-10-CM

## 2020-06-25 DIAGNOSIS — F60.3 BORDERLINE PERSONALITY DISORDER (HCC): ICD-10-CM

## 2020-06-25 DIAGNOSIS — F43.12 CHRONIC POST-TRAUMATIC STRESS DISORDER (PTSD): ICD-10-CM

## 2020-06-25 PROCEDURE — 99213 OFFICE O/P EST LOW 20 MIN: CPT | Performed by: NURSE PRACTITIONER

## 2020-06-25 RX ORDER — PRAZOSIN HYDROCHLORIDE 2 MG/1
2 CAPSULE ORAL NIGHTLY
Qty: 30 CAPSULE | Refills: 1 | Status: SHIPPED | OUTPATIENT
Start: 2020-06-25 | End: 2020-08-17 | Stop reason: SDUPTHER

## 2020-06-25 RX ORDER — BUSPIRONE HYDROCHLORIDE 10 MG/1
10 TABLET ORAL 3 TIMES DAILY
Qty: 90 TABLET | Refills: 1 | Status: SHIPPED | OUTPATIENT
Start: 2020-06-25 | End: 2020-08-17 | Stop reason: SDUPTHER

## 2020-06-25 RX ORDER — LAMOTRIGINE 25 MG/1
50 TABLET ORAL DAILY
Qty: 60 TABLET | Refills: 1 | Status: SHIPPED | OUTPATIENT
Start: 2020-06-25 | End: 2020-08-06

## 2020-06-25 NOTE — PROGRESS NOTES
Subjective   Samantha Dubose is a 20 y.o. female who is being seen for medication management today.     This provider is located at [facility], [address] [place] [address], [place] [address]. The Patient is seen remotely at [place] [address] [place] [address], using [employer]. Patient is being seen via telehealth and confirm that they are in a secure environment for this session. The patient's condition being diagnosed/treated is appropriate for telemedicine. The provider identified himself/herself: herself as well as her credentials.   The mother and patient gave consent to be seen remotely, and when consent is given they understand that the consent allows for patient identifiable information to be sent to a third party as needed.   They may refuse to be seen remotely at any time. The electronic data is encrypted and password protected, and the patient has been advised of the potential risks to privacy not withstanding such measures.    You have chosen to receive care through a telehealth visit.  Do you consent to use a video/audio connection for your medical care today? Yes        Chief Complaint: follow-up for mood    History of Present Illness   Patient is seen via my chart today and is accompanied by her mother whom she has consented and agreed with to be present.  Patient reports that she has been doing good but she is just tired from last night as she was having stomach issues that were isolated.  Patient denies any depressive symptoms.  Patient reports that her anxiety does get elevated at times and she has to take her medication but for the most part she is doing well.  Patient reports that her appetite has been good.  Patient states that with the melatonin and her other sleep medications that she is doing good and sleeping well.  Patient denied any rash or side effects to the medication.  Spoke with mother and she states overall the patient is doing good except for some  anxiety but otherwise managing.  Patient and mother denied any other symptoms or side effects.  Patient denies any SI/HI/AH/VH.       The following portions of the patient's history were reviewed and updated as appropriate: allergies, current medications, past family history, past medical history, past social history, past surgical history and problem list.    Review of Systems   Constitutional: Negative for appetite change.   Neurological: Negative for dizziness.   Psychiatric/Behavioral: Negative for agitation, behavioral problems, dysphoric mood, hallucinations, self-injury and sleep disturbance. The patient is nervous/anxious.    All other systems reviewed and are negative.      Objective   Physical Exam   Constitutional: She appears well-developed and well-nourished.   Psychiatric: Her speech is normal and behavior is normal. Judgment and thought content normal. Her mood appears anxious. Her affect is not angry. She is not agitated and not actively hallucinating. Cognition and memory are normal. She does not express impulsivity. She does not exhibit a depressed mood. She is attentive.     There were no vitals taken for this visit. There is no height or weight on file to calculate BMI.  Due to extenuating circumstances and possible current health risks associated with the patient being present in a clinical setting (with current health restrictions in place in regards to possible COVID 19 transmission/exposure), the patient was seen remotely today via a MyChart Video Visit through [employer].  Unable to obtain vital signs due to nature of remote visit.  Height stated at 65 inches.  Weight stated at 139 pounds.      No Known Allergies    Current Medications:   Current Outpatient Medications   Medication Sig Dispense Refill   • busPIRone (BUSPAR) 10 MG tablet Take 1 tablet by mouth 3 (Three) Times a Day. 90 tablet 1   • lamoTRIgine (LaMICtal) 25 MG tablet Take 2 tablets by mouth Daily. 60 tablet 1   •  levonorgestrel-ethinyl estradiol (LESSINA) 0.1-20 MG-MCG per tablet Take 1 tablet by mouth Daily.     • prazosin (MINIPRESS) 2 MG capsule Take 1 capsule by mouth Every Night. 30 capsule 1     No current facility-administered medications for this visit.        Mental Status Exam:   Hygiene:   good  Cooperation: Cooperative  Eye Contact: Good  Psychomotor Behavior: Appropriate   Affect:  Full range  Hopelessness: Denies  Speech:  Normal  Thought Process:  Goal directed  Thought Content:  Mood congruent   Suicidal:  None  Homicidal:  None  Hallucinations:  None  Delusion:  None  Memory:  Intact  Orientation:  Person, Place, Time and Situation  Reliability:  Good   Insight:  Fair  Judgement:  Fair  Impulse Control:  Fair  Physical/Medical Issues:  No     Assessment/Plan   Diagnoses and all orders for this visit:    Generalized anxiety disorder  -     busPIRone (BUSPAR) 10 MG tablet; Take 1 tablet by mouth 3 (Three) Times a Day.  -     lamoTRIgine (LaMICtal) 25 MG tablet; Take 2 tablets by mouth Daily.    Chronic post-traumatic stress disorder (PTSD)  -     busPIRone (BUSPAR) 10 MG tablet; Take 1 tablet by mouth 3 (Three) Times a Day.  -     lamoTRIgine (LaMICtal) 25 MG tablet; Take 2 tablets by mouth Daily.  -     prazosin (MINIPRESS) 2 MG capsule; Take 1 capsule by mouth Every Night.    Mild episode of recurrent major depressive disorder (CMS/HCC)  -     lamoTRIgine (LaMICtal) 25 MG tablet; Take 2 tablets by mouth Daily.    Borderline personality disorder (CMS/HCC)  -     lamoTRIgine (LaMICtal) 25 MG tablet; Take 2 tablets by mouth Daily.    Sleeping difficulty      Discussed medications and any side effects as well as risk.  Answered any questions patient had with medication and plan.  Patient denies any side effects or rash to the current medications.  Patient's only complaint at this time was her anxiety and sleep.      Continue lamotrigine  50 mg daily for depression and BPD.  Encouraged the patient that she can  take 10 mg twice a day for anxiety that if in the middle of the day her anxiety is increased she can take 10mg, if tolerated of the Buspar.  Continue Minipress 2 mg at night for nightmares. Advised patient to only take OTC sleep aides at night as needed due to avoiding excess amounts of Tylenol.   Informed the patient that if Minipress was causing the dizziness then we can discontinue but patient denied wanting to discontinue at this time given its effectiveness.  Discussed the risks, beneefits, and side effects of the medications; patient ackowledged and verbally consented.  Patient is aware to call the clinic with any worsening of symptoms.  Patient is agreeable to call 911 or go to the nearest ER should he/she begin having SI/HI. Patient was strongly encouraged to continue birth control.  Patient was counseled regarding need not to become pregnant prior to discussion and possible titration and discontinuation of medications.  An explanation was provided to the patient regarding the risk of fetal harm with psychotrophic medications.  Patient was provided education regarding both risk of continuing and discontinuing medications during pregnancy.  Patient verbalized understanding. We will continue Lamictal in an effort to stabilize mood.  The patient was reminded to immediately come to the hospital should there be any loss of control.  Explanation was given to her regarding Lamictal and the potential for Dylan Stoney syndrome and significant rash.  Patient was encouraged to check skin prior to beginning.  Patient was encouraged to report any rash and to immediately stop medication.       Treatment Plan  Prognosis: Guarded dependent on medication/follow up and treatment plan compliance.   Functionality: pt showing improvements in important areas of daily functioning.  Patient will follow-up in 8 weeks, encourage patient and the mother that if they had any questions or concerns or worsening symptoms to contact the  clinic both verbalized and agreed.     Short-term goals: Patient will adhere to medication regimen above and note a decrease in anxiety over the next 8 weeks.  Long-term goals: Patient will continue with medication regimen and therapy and note an improvement in symptoms and daily functioning over the next 6 months.    Errors in dictation may reflect use of voice recognition software and not all errors in transcription may have been detected prior to signing.

## 2020-08-06 DIAGNOSIS — F43.12 CHRONIC POST-TRAUMATIC STRESS DISORDER (PTSD): ICD-10-CM

## 2020-08-06 DIAGNOSIS — F60.3 BORDERLINE PERSONALITY DISORDER (HCC): ICD-10-CM

## 2020-08-06 DIAGNOSIS — F33.0 MILD EPISODE OF RECURRENT MAJOR DEPRESSIVE DISORDER (HCC): ICD-10-CM

## 2020-08-06 DIAGNOSIS — F41.1 GENERALIZED ANXIETY DISORDER: ICD-10-CM

## 2020-08-06 RX ORDER — LAMOTRIGINE 25 MG/1
50 TABLET ORAL DAILY
Qty: 60 TABLET | Refills: 0 | Status: SHIPPED | OUTPATIENT
Start: 2020-08-06 | End: 2020-08-17 | Stop reason: SDUPTHER

## 2020-08-17 ENCOUNTER — TELEMEDICINE (OUTPATIENT)
Dept: PSYCHIATRY | Facility: CLINIC | Age: 20
End: 2020-08-17

## 2020-08-17 DIAGNOSIS — F41.1 GENERALIZED ANXIETY DISORDER: ICD-10-CM

## 2020-08-17 DIAGNOSIS — F60.3 BORDERLINE PERSONALITY DISORDER (HCC): ICD-10-CM

## 2020-08-17 DIAGNOSIS — F33.3 SEVERE EPISODE OF RECURRENT MAJOR DEPRESSIVE DISORDER, WITH PSYCHOTIC FEATURES (HCC): Primary | ICD-10-CM

## 2020-08-17 DIAGNOSIS — F43.12 CHRONIC POST-TRAUMATIC STRESS DISORDER (PTSD): ICD-10-CM

## 2020-08-17 PROCEDURE — 90833 PSYTX W PT W E/M 30 MIN: CPT | Performed by: NURSE PRACTITIONER

## 2020-08-17 PROCEDURE — 99214 OFFICE O/P EST MOD 30 MIN: CPT | Performed by: NURSE PRACTITIONER

## 2020-08-17 RX ORDER — BUSPIRONE HYDROCHLORIDE 10 MG/1
10 TABLET ORAL 3 TIMES DAILY
Qty: 90 TABLET | Refills: 1 | Status: SHIPPED | OUTPATIENT
Start: 2020-08-17 | End: 2020-09-21 | Stop reason: SDUPTHER

## 2020-08-17 RX ORDER — LAMOTRIGINE 100 MG/1
100 TABLET ORAL DAILY
Qty: 30 TABLET | Refills: 0 | Status: SHIPPED | OUTPATIENT
Start: 2020-08-17 | End: 2020-08-24 | Stop reason: SDUPTHER

## 2020-08-17 RX ORDER — PRAZOSIN HYDROCHLORIDE 2 MG/1
2 CAPSULE ORAL NIGHTLY
Qty: 30 CAPSULE | Refills: 1 | Status: SHIPPED | OUTPATIENT
Start: 2020-08-17 | End: 2020-08-24 | Stop reason: SDUPTHER
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
## 2020-08-24 ENCOUNTER — TELEMEDICINE (OUTPATIENT)
Dept: PSYCHIATRY | Facility: CLINIC | Age: 20
End: 2020-08-24

## 2020-08-24 VITALS
HEART RATE: 79 BPM | HEIGHT: 65 IN | WEIGHT: 133.4 LBS | DIASTOLIC BLOOD PRESSURE: 62 MMHG | BODY MASS INDEX: 22.23 KG/M2 | SYSTOLIC BLOOD PRESSURE: 103 MMHG

## 2020-08-24 DIAGNOSIS — F60.3 BORDERLINE PERSONALITY DISORDER (HCC): ICD-10-CM

## 2020-08-24 DIAGNOSIS — F33.2 SEVERE EPISODE OF RECURRENT MAJOR DEPRESSIVE DISORDER, WITHOUT PSYCHOTIC FEATURES (HCC): Primary | ICD-10-CM

## 2020-08-24 DIAGNOSIS — F41.1 GENERALIZED ANXIETY DISORDER: ICD-10-CM

## 2020-08-24 DIAGNOSIS — F43.12 CHRONIC POST-TRAUMATIC STRESS DISORDER (PTSD): ICD-10-CM

## 2020-08-24 PROCEDURE — 99213 OFFICE O/P EST LOW 20 MIN: CPT | Performed by: NURSE PRACTITIONER

## 2020-08-24 RX ORDER — PRAZOSIN HYDROCHLORIDE 2 MG/1
4 CAPSULE ORAL NIGHTLY
Qty: 60 CAPSULE | Refills: 0 | Status: SHIPPED | OUTPATIENT
Start: 2020-08-24 | End: 2020-09-21 | Stop reason: SDUPTHER

## 2020-08-24 RX ORDER — LAMOTRIGINE 100 MG/1
100 TABLET ORAL DAILY
Qty: 30 TABLET | Refills: 0 | Status: SHIPPED | OUTPATIENT
Start: 2020-08-24 | End: 2020-09-21 | Stop reason: SDUPTHER

## 2020-08-24 NOTE — PROGRESS NOTES
"Minerva Dubose is a 20 y.o. female who is being seen for medication management today.     This provider is located at [facility] [place] [address]. The Patient is seen remotely at home [address] [place] [address], using [employer]. Patient is being seen via telehealth and confirm that they are in a secure environment for this session. The patient's condition being diagnosed/treated is appropriate for telemedicine. The provider identified himself/herself: herself as well as her credentials.   The mother and patient gave consent to be seen remotely, and when consent is given they understand that the consent allows for patient identifiable information to be sent to a third party as needed.   They may refuse to be seen remotely at any time. The electronic data is encrypted and password protected, and the patient has been advised of the potential risks to privacy not withstanding such measures.    You have chosen to receive care through a telehealth visit.  Do you consent to use a video/audio connection for your medical care today? Yes        Chief Complaint: Follow-up for hallucinations     History of Present Illness   Patient is seen today at her home via my chart with her mother present which she has consented and agreed with.  Patient states that she is \"pretty good\".  Patient reports that she thinks the Lamictal is working good.  Patient states that she is not saw the woman or had any significant auditory or visual hallucinations since her last visit.  Patient states \"sometimes shadowy figures\" that she sees at times but states that that is slowly decreasing after 1 week of the increase in lamotrigine.  Patient states that her mood is overall been good if she is agitated at times but reports that she is currently on her menstrual cycle so that contributes as well.  Patient states that she is started taking 2 of the Minipress which has " "decreased her nightmares significantly as she is only had 1.  Patient states she has had some dizziness which she has had previously in the past but reports has not been significant and not consistent.  Patient states that she has been getting on a better sleep schedule and waking up earlier as she is getting roughly 7 to 8 hours of sleep at night.  Patient states her appetite is fair.  Patient reports she is working on her schoolwork.  Mother states that she seems to be doing better.  Patient denies any rash or side effects to the current medications.  Patient adamantly denies any SI or HI or that anyone is telling her to harm herself or anyone else. Patient denies any self-harm.            The following portions of the patient's history were reviewed and updated as appropriate: allergies, current medications, past family history, past medical history, past social history, past surgical history and problem list.    Review of Systems   Constitutional: Negative for appetite change.   Neurological: Negative for dizziness.   Psychiatric/Behavioral: Positive for agitation and hallucinations. Negative for behavioral problems, dysphoric mood, self-injury and sleep disturbance. The patient is nervous/anxious.    All other systems reviewed and are negative.      Objective   Physical Exam   Constitutional: She appears well-developed and well-nourished.   Psychiatric: Her speech is normal. Thought content normal. Her mood appears anxious. Her affect is not angry. She is agitated. She is not actively hallucinating. Thought content is not paranoid. Cognition and memory are normal. She does not express impulsivity. She does not exhibit a depressed mood. She is attentive.   Vitals reviewed.    Blood pressure 103/62, pulse 79, height 165.1 cm (65\"), weight 60.5 kg (133 lb 6.4 oz). Body mass index is 22.2 kg/m². Pt had home BP cuff used during visit.       No Known Allergies    Current Medications:   Current Outpatient Medications "   Medication Sig Dispense Refill   • busPIRone (BUSPAR) 10 MG tablet Take 1 tablet by mouth 3 (Three) Times a Day. 90 tablet 1   • lamoTRIgine (LaMICtal) 100 MG tablet Take 1 tablet by mouth Daily. 30 tablet 0   • levonorgestrel-ethinyl estradiol (LESSINA) 0.1-20 MG-MCG per tablet Take 1 tablet by mouth Daily.     • prazosin (MINIPRESS) 2 MG capsule Take 2 capsules by mouth Every Night. 60 capsule 0     No current facility-administered medications for this visit.        Mental Status Exam:   Hygiene:   good  Cooperation: Cooperative  Eye Contact: Good  Psychomotor Behavior: Appropriate   Affect:  Full range  Hopelessness: Denies  Speech:  Normal  Thought Process:  Goal directed  Thought Content:  Mood congruent   Suicidal:  None  Homicidal:  None  Hallucinations:  None  Delusion:  None  Memory:  Intact  Orientation:  Person, Place, Time and Situation  Reliability:  Good   Insight:  Fair  Judgement:  Fair  Impulse Control:  Fair  Physical/Medical Issues:  No     Assessment/Plan   Diagnoses and all orders for this visit:    Severe episode of recurrent major depressive disorder, without psychotic features (CMS/HCC)  -     lamoTRIgine (LaMICtal) 100 MG tablet; Take 1 tablet by mouth Daily.    Generalized anxiety disorder  -     lamoTRIgine (LaMICtal) 100 MG tablet; Take 1 tablet by mouth Daily.    Chronic post-traumatic stress disorder (PTSD)  -     lamoTRIgine (LaMICtal) 100 MG tablet; Take 1 tablet by mouth Daily.  -     prazosin (MINIPRESS) 2 MG capsule; Take 2 capsules by mouth Every Night.    Borderline personality disorder (CMS/HCC)  -     lamoTRIgine (LaMICtal) 100 MG tablet; Take 1 tablet by mouth Daily.      Discussed medication options as well as side effects and risk and benefits and current mood in detail with the patient and her mother.  Answered any questions patient had with medication and plan.  Highly advised patient and mother regarding safety plan.  Highly advised them that if the patient had any more  hallucinations of seeing the figure in her telling her to harm herself or anyone else to report immediately to the ER patient and mother both verbalized understanding.  Reinforced to the patient that the medications would be more effective in the weeks to come but if she had any of those hallucinations return to go immediately to the ER both her and her mother verbalized understanding.    Patient has now failed and tried risperidone, Adderall, citalopram, escitalopram, trazodone, sertraline, Trileptal, Prozac, Wellbutrin, amitriptyline, aripiprazole.       Continue lamotrigine 100 mg daily for BPD and MDD with psychotic features as it has been beneficial for the patient in the past in controlling her mood as well as hallucinations.  Continue BuSpar 10 mg twice daily for anxiety.  Continue Minipress 4 mg at night for nightmares. Advised patient to only take OTC sleep aides at night as needed due to avoiding excess amounts of Tylenol. Discussed the risks, benefits, and side effects of the medications; patient ackowledged and verbally consented.  Patient is aware to call the clinic with any worsening of symptoms.  Patient is agreeable to call 911 or go to the nearest ER should he/she begin having SI/HI. Patient was strongly encouraged to continue birth control.  Patient was counseled regarding need not to become pregnant prior to discussion and possible titration and discontinuation of medications.  An explanation was provided to the patient regarding the risk of fetal harm with psychotrophic medications.  Patient was provided education regarding both risk of continuing and discontinuing medications during pregnancy.  Patient verbalized understanding. We will continue Lamictal in an effort to stabilize mood.  The patient was reminded to immediately come to the hospital should there be any loss of control.  Explanation was given to her regarding Lamictal and the potential for Dylan Stoney syndrome and significant  rash.  Patient was encouraged to check skin prior to beginning.  Patient was encouraged to report any rash and to immediately stop medication.     Patient was encouraged to not have fire arms in the home. If firearms are present they need to be locked and secured and bullets in separate areas. The patient was also encouraged if suicidal thoughts are present then she should, call the clinic and report to the ER for psychiatric evaluation.       Treatment Plan  Prognosis: Guarded dependent on medication/follow up and treatment plan compliance.   Functionality: pt showing improvements in important areas of daily functioning.    Patient will follow-up in 4 weeks, encourage patient and the mother that if they had any questions or concerns or worsening symptoms to contact the clinic both verbalized and agreed.     Short-term goals: Patient will adhere to medication regimen above and note a decrease in anxiety over the next 2-4 weeks.  Long-term goals: Patient will continue with medication regimen and therapy and note an improvement in symptoms and daily functioning over the next 6 months.    Errors in dictation may reflect use of voice recognition software and not all errors in transcription may have been detected prior to signing.

## 2020-09-21 ENCOUNTER — TELEMEDICINE (OUTPATIENT)
Dept: PSYCHIATRY | Facility: CLINIC | Age: 20
End: 2020-09-21

## 2020-09-21 DIAGNOSIS — F41.1 GENERALIZED ANXIETY DISORDER: ICD-10-CM

## 2020-09-21 DIAGNOSIS — F33.2 SEVERE EPISODE OF RECURRENT MAJOR DEPRESSIVE DISORDER, WITHOUT PSYCHOTIC FEATURES (HCC): ICD-10-CM

## 2020-09-21 DIAGNOSIS — F43.12 CHRONIC POST-TRAUMATIC STRESS DISORDER (PTSD): ICD-10-CM

## 2020-09-21 DIAGNOSIS — F60.3 BORDERLINE PERSONALITY DISORDER (HCC): Primary | ICD-10-CM

## 2020-09-21 DIAGNOSIS — G47.9 SLEEPING DIFFICULTY: ICD-10-CM

## 2020-09-21 PROCEDURE — 99213 OFFICE O/P EST LOW 20 MIN: CPT | Performed by: NURSE PRACTITIONER

## 2020-09-21 RX ORDER — LAMOTRIGINE 100 MG/1
100 TABLET ORAL DAILY
Qty: 30 TABLET | Refills: 0 | Status: SHIPPED | OUTPATIENT
Start: 2020-09-21 | End: 2020-10-19 | Stop reason: SDUPTHER

## 2020-09-21 RX ORDER — QUETIAPINE FUMARATE 25 MG/1
25 TABLET, FILM COATED ORAL NIGHTLY PRN
Qty: 30 TABLET | Refills: 0 | Status: SHIPPED | OUTPATIENT
Start: 2020-09-21 | End: 2020-10-19

## 2020-09-21 RX ORDER — BUSPIRONE HYDROCHLORIDE 10 MG/1
10 TABLET ORAL 3 TIMES DAILY
Qty: 90 TABLET | Refills: 1 | Status: SHIPPED | OUTPATIENT
Start: 2020-09-21 | End: 2020-10-19 | Stop reason: SDUPTHER

## 2020-09-21 RX ORDER — PRAZOSIN HYDROCHLORIDE 2 MG/1
4 CAPSULE ORAL NIGHTLY
Qty: 60 CAPSULE | Refills: 0 | Status: SHIPPED | OUTPATIENT
Start: 2020-09-21 | End: 2020-10-19 | Stop reason: SDUPTHER

## 2020-09-21 NOTE — PROGRESS NOTES
"Minerva Dubose is a 20 y.o. female who is being seen for medication management today.     This provider is located at [facility], [facility] [place] [address]. The Patient is seen remotely at home [address] [place] [address], using [employer]. Patient is being seen via telehealth and confirm that they are in a secure environment for this session. The patient's condition being diagnosed/treated is appropriate for telemedicine. The provider identified himself/herself: herself as well as her credentials.   The mother and patient gave consent to be seen remotely, and when consent is given they understand that the consent allows for patient identifiable information to be sent to a third party as needed.   They may refuse to be seen remotely at any time. The electronic data is encrypted and password protected, and the patient has been advised of the potential risks to privacy not withstanding such measures.    You have chosen to receive care through a telehealth visit.  Do you consent to use a video/audio connection for your medical care today? Yes        Chief Complaint: Follow-up for mood     History of Present Illness   Patient is seen today via my chart as her mother is present per the patient's agreement.  Patient reports that she is doing \"okay she guesses\".  Patient states that this week is difficult for her as she is on her.  And her mood feels \"blah\".  When asking the patient how her mood was last week or depression she states that she does not know that she would have to ask her mother.  Her mother reports that she is still chu at times.  Patient reports her anxiety is doing well.  Patient states that her depression is roughly a 5 or 6 as it Comes and goes as it is more difficult this week due to her menstrual cycle.  Patient still notes some anger at times.  Patient states however she has been listening to Episcopal music which helps with her " "overall mood.  Patient denies any side effects to the current medication or any rashes.  Patient reports that the nightmares have subsided but she is still having trouble with her sleep as she wakes up at least 6-7 times during the night and has a hard time going back to sleep as it takes her an hour or more.  Patient notes that she may average 10 hours of sleep but it is not altogether and she does not feel rested.  When asking patient how much sleep she actually gets she states \"I do not know I am not good at math\".  Patient states that she always feels tired so encouraged her to get outside and walk especially when it is cora to increase vitamin D since she refuses lab work and patient refused this.  Patient states that she is aware she knows she has to work on herself in therapy but she just does not like doing it.  Patient states that the hallucinations have subsided which she is grateful for.  Patient reports her appetite has been good as there is not been any changes.  Patient denies any SI/HI/AH/VH or any self-harm.        The following portions of the patient's history were reviewed and updated as appropriate: allergies, current medications, past family history, past medical history, past social history, past surgical history and problem list.    Review of Systems   Constitutional: Negative for appetite change.   Neurological: Negative for dizziness.   Psychiatric/Behavioral: Positive for agitation and hallucinations. Negative for behavioral problems, dysphoric mood, self-injury and sleep disturbance. The patient is nervous/anxious.    All other systems reviewed and are negative.      Objective   Physical Exam   Constitutional: She appears well-developed.   Psychiatric: Her speech is normal. Thought content normal. Her mood appears anxious. Her affect is not angry. She is agitated. Thought content is not paranoid. She does not express impulsivity. She does not exhibit a depressed mood. She is attentive. "   Vitals reviewed.    There were no vitals taken for this visit. There is no height or weight on file to calculate BMI. Due to extenuating circumstances and possible current health risks associated with the patient being present in a clinical setting (with current health restrictions in place in regards to possible COVID 19 transmission/exposure), the patient was seen remotely today via a MyChart Video Visit through [facility].  Unable to obtain vital signs due to nature of remote visit.  Height stated at 65 inches.  Weight stated at 133 pounds.        No Known Allergies    Current Medications:   Current Outpatient Medications   Medication Sig Dispense Refill   • busPIRone (BUSPAR) 10 MG tablet Take 1 tablet by mouth 3 (Three) Times a Day. 90 tablet 1   • lamoTRIgine (LaMICtal) 100 MG tablet Take 1 tablet by mouth Daily. 30 tablet 0   • levonorgestrel-ethinyl estradiol (LESSINA) 0.1-20 MG-MCG per tablet Take 1 tablet by mouth Daily.     • prazosin (MINIPRESS) 2 MG capsule Take 2 capsules by mouth Every Night. 60 capsule 0   • QUEtiapine (SEROquel) 25 MG tablet Take 1 tablet by mouth At Night As Needed (SLEEP). 30 tablet 0     No current facility-administered medications for this visit.        Mental Status Exam:   Hygiene:   good  Cooperation: Guarded  Eye Contact: Good  Psychomotor Behavior: Appropriate   Affect:  Full range  Hopelessness: Denies  Speech:  Normal  Thought Process:  Goal directed  Thought Content:  Mood congruent   Suicidal:  None  Homicidal:  None  Hallucinations:  None  Delusion:  None  Memory:  Intact  Orientation:  Person, Place, Time and Situation  Reliability:  Good   Insight:  Fair  Judgement:  Fair  Impulse Control:  Fair  Physical/Medical Issues:  No     Assessment/Plan   Diagnoses and all orders for this visit:    Borderline personality disorder (CMS/HCC)  -     lamoTRIgine (LaMICtal) 100 MG tablet; Take 1 tablet by mouth Daily.    Generalized anxiety disorder  -     lamoTRIgine (LaMICtal) 100  "MG tablet; Take 1 tablet by mouth Daily.  -     busPIRone (BUSPAR) 10 MG tablet; Take 1 tablet by mouth 3 (Three) Times a Day.  -     QUEtiapine (SEROquel) 25 MG tablet; Take 1 tablet by mouth At Night As Needed (SLEEP).    Chronic post-traumatic stress disorder (PTSD)  -     prazosin (MINIPRESS) 2 MG capsule; Take 2 capsules by mouth Every Night.  -     lamoTRIgine (LaMICtal) 100 MG tablet; Take 1 tablet by mouth Daily.  -     busPIRone (BUSPAR) 10 MG tablet; Take 1 tablet by mouth 3 (Three) Times a Day.    Severe episode of recurrent major depressive disorder, without psychotic features (CMS/HCC)  -     lamoTRIgine (LaMICtal) 100 MG tablet; Take 1 tablet by mouth Daily.    Sleeping difficulty  -     QUEtiapine (SEROquel) 25 MG tablet; Take 1 tablet by mouth At Night As Needed (SLEEP).      Discussed medication options as well as side effects and risk and benefits and current mood in detail with the patient and her mother.  Answered any questions patient had with medication and plan.  Patient has a more guarded mood and affect during this visit as she states she is currently on her.  So her mood is worse.  Patient still continues to be defiant and not wanting to improve upon herself which she is aware of ending and states \"she is lazy\".  Patient states that she does not like therapy as she has to actually work and be honest.  Highly encouraged patient that she is aware she is going to have to start doing things more for herself and medication is not the sole factor that is going to improve her motivation for mood.    Patient has now failed and tried risperidone, Adderall, citalopram, escitalopram, trazodone, sertraline, Trileptal, Prozac, Wellbutrin, amitriptyline, aripiprazole.       -Continue lamotrigine 100 mg daily for BPD and MDD.   -Continue BuSpar 10 mg twice daily for anxiety.    -Continue Minipress 4 mg at night for nightmares.    -Begin Seroquel 25 mg at night as needed for sleep and encourage patient to " avoid other over-the-counter medications if she is using the Seroquel.    Discussed the risks, benefits, and side effects of the medications; patient ackowledged and verbally consented.  Patient is aware to call the clinic with any worsening of symptoms.  Patient is agreeable to call 911 or go to the nearest ER should he/she begin having SI/HI. Patient was strongly encouraged to continue birth control.  Patient was counseled regarding need not to become pregnant prior to discussion and possible titration and discontinuation of medications.  An explanation was provided to the patient regarding the risk of fetal harm with psychotrophic medications.  Patient was provided education regarding both risk of continuing and discontinuing medications during pregnancy.  Patient verbalized understanding. We will continue Lamictal in an effort to stabilize mood.  The patient was reminded to immediately come to the hospital should there be any loss of control.  Explanation was given to her regarding Lamictal and the potential for Dylan Stoney syndrome and significant rash.  Patient was encouraged to check skin prior to beginning.  Patient was encouraged to report any rash and to immediately stop medication.     Patient was encouraged to not have fire arms in the home. If firearms are present they need to be locked and secured and bullets in separate areas. The patient was also encouraged if suicidal thoughts are present then she should, call the clinic and report to the ER for psychiatric evaluation.       Treatment Plan  Prognosis: Guarded dependent on medication/follow up and treatment plan compliance.   Functionality: pt showing improvements in important areas of daily functioning.    Patient will follow-up in 4 weeks, encourage patient and the mother that if they had any questions or concerns or worsening symptoms to contact the clinic both verbalized and agreed.     Short-term goals: Patient will adhere to medication  regimen above and note a decrease in anxiety over the next 2-4 weeks.  Long-term goals: Patient will continue with medication regimen and therapy and note an improvement in symptoms and daily functioning over the next 6 months.    Errors in dictation may reflect use of voice recognition software and not all errors in transcription may have been detected prior to signing.

## 2020-10-06 ENCOUNTER — TELEPHONE (OUTPATIENT)
Dept: PSYCHIATRY | Facility: CLINIC | Age: 20
End: 2020-10-06

## 2020-10-06 NOTE — TELEPHONE ENCOUNTER
Mom said she thought that it was the personality disorder too said she is learning to  on her behavior at times said that she will seek the ER if she thinks that the patients needs to go,  worked patient in on Thursday for an apt mom was agreeable to this plan.

## 2020-10-06 NOTE — TELEPHONE ENCOUNTER
Mom called stated that the Seroquel aram stop taking said it was helping  With her sleep but she was sleeping a lot  but her attitude changed completely and she stop it... said she has been cutting again and not sleeping said it like she has had a relapse and can not bounce back from it.. mom concerned  Maybe needs to see you sooner? Please Advise

## 2020-10-06 NOTE — TELEPHONE ENCOUNTER
Please tell mom I believe it is from her personality disorder and we could have adjusted the medication to not sleep too much. She may not want to go but if her mood worsens with self harm I recommend her going to the ER for evaluation. I have a 2:00 opening tomorrow if they can do that I will see them tomorrow, but still recommended the ER if she is self harming and more depressed with any SI. Thanks.

## 2020-10-08 ENCOUNTER — TELEMEDICINE (OUTPATIENT)
Dept: PSYCHIATRY | Facility: CLINIC | Age: 20
End: 2020-10-08

## 2020-10-08 DIAGNOSIS — F33.2 SEVERE EPISODE OF RECURRENT MAJOR DEPRESSIVE DISORDER, WITHOUT PSYCHOTIC FEATURES (HCC): ICD-10-CM

## 2020-10-08 DIAGNOSIS — G47.9 SLEEPING DIFFICULTY: ICD-10-CM

## 2020-10-08 DIAGNOSIS — F60.3 BORDERLINE PERSONALITY DISORDER (HCC): Primary | ICD-10-CM

## 2020-10-08 DIAGNOSIS — F41.1 GENERALIZED ANXIETY DISORDER: ICD-10-CM

## 2020-10-08 DIAGNOSIS — F43.12 CHRONIC POST-TRAUMATIC STRESS DISORDER (PTSD): ICD-10-CM

## 2020-10-08 PROCEDURE — 90833 PSYTX W PT W E/M 30 MIN: CPT | Performed by: NURSE PRACTITIONER

## 2020-10-08 PROCEDURE — 99214 OFFICE O/P EST MOD 30 MIN: CPT | Performed by: NURSE PRACTITIONER

## 2020-10-08 NOTE — PROGRESS NOTES
"Minerva Dubose is a 20 y.o. female who is being seen for an urgent appointment today.     This provider is located at [facility], [address] [place] [address], [place] [address]. The Patient is seen remotely at home [address] [place] [address], using [employer]. Patient is being seen via telehealth and confirm that they are in a secure environment for this session. The patient's condition being diagnosed/treated is appropriate for telemedicine. The provider identified himself/herself: herself as well as her credentials.   The mother and patient gave consent to be seen remotely, and when consent is given they understand that the consent allows for patient identifiable information to be sent to a third party as needed.   They may refuse to be seen remotely at any time. The electronic data is encrypted and password protected, and the patient has been advised of the potential risks to privacy not withstanding such measures.    You have chosen to receive care through a telehealth visit.  Do you consent to use a video/audio connection for your medical care today? Yes        Chief Complaint: recent self harm    History of Present Illness   Patient and her mother had this appointment scheduled as an urgent appointment due to the patient self harming last week.  Patient states that she cut her arms last week as it was only a couple of times and not severe but mother reports that she did end up finding out about it and confronted her is why she is scheduled for sooner appointment.  When asking the patient what it happened she stated that \"upset about stuff\".  When asking patient in more detail what she was upset about she stated that there was issues with her brother and her mom was not at home as well as life stressors and the focus and attention was not placed on her so she self-harm.  Patient states that she did lose her dog yesterday morning so that has affected her mood some " but she did talk to her therapist yesterday which was helpful.  Patient stated that she had to stop the Seroquel as it caused her to be more agitated and irritable but did help with her sleep.  Mother states that she does not know if it made her mood worse but did know that she has had more of an attitude and angry and hostility this past week.  Other also notes that attention has not been focused on her and believes that is the cause of the problem as well.  Discussed in detail regarding patient regarding borderline personality disorder.  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  Patient denied any depressive or anxiety symptoms reports that she is mostly angry.  Patient states that sleeping is average 6 hours but still nausea and sleep cycle.  Patient reports appetite has been good.  Patient denies any self-harm since last week.  Patient adamantly denies any SI/HI/AH/VH.        The following portions of the patient's history were reviewed and updated as appropriate: allergies, current medications, past family history, past medical history, past social history, past surgical history and problem list.    Review of Systems   Constitutional: Negative for appetite change.   Neurological: Negative for dizziness.   Psychiatric/Behavioral: Positive for agitation and sleep disturbance. Negative for behavioral problems, dysphoric mood, hallucinations and self-injury. The patient is not nervous/anxious.        Objective   Physical Exam   Constitutional: She appears well-developed. She is cooperative.   Abdominal: Normal appearance.   Neurological: She is alert.   Psychiatric: Her speech is normal. Mood and memory normal. Her mood appears not anxious. Her affect is labile. Her affect is not angry. Thought content is not paranoid. She expresses impulsivity. She does not exhibit a depressed mood. She is inattentive.   Nursing  note reviewed.    There were no vitals taken for this visit. There is no height or weight on file to calculate BMI. Due to extenuating circumstances and possible current health risks associated with the patient being present in a clinical setting (with current health restrictions in place in regards to possible COVID 19 transmission/exposure), the patient was seen remotely today via a MyChart Video Visit through [facility].  Unable to obtain vital signs due to nature of remote visit.  Height stated at 65 inches.  Weight stated at 133 pounds.        No Known Allergies    Current Medications:   Current Outpatient Medications   Medication Sig Dispense Refill   • busPIRone (BUSPAR) 10 MG tablet Take 1 tablet by mouth 3 (Three) Times a Day. 90 tablet 1   • lamoTRIgine (LaMICtal) 100 MG tablet Take 1 tablet by mouth Daily. 30 tablet 0   • levonorgestrel-ethinyl estradiol (LESSINA) 0.1-20 MG-MCG per tablet Take 1 tablet by mouth Daily.     • prazosin (MINIPRESS) 2 MG capsule Take 2 capsules by mouth Every Night. 60 capsule 0   • QUEtiapine (SEROquel) 25 MG tablet Take 1 tablet by mouth At Night As Needed (SLEEP). 30 tablet 0     No current facility-administered medications for this visit.        Mental Status Exam:   Hygiene:   good  Cooperation: Guarded but cooperative  Eye Contact: Good  Psychomotor Behavior: Appropriate   Affect:  Full range  Hopelessness: Denies  Speech:  Normal  Thought Process:  Goal directed  Thought Content:  Mood congruent   Suicidal:  None  Homicidal:  None  Hallucinations:  None  Delusion:  None  Memory:  Intact  Orientation:  Person, Place, Time and Situation  Reliability:  Good   Insight:  Fair  Judgement:  Fair  Impulse Control:  Fair  Physical/Medical Issues:  No     Assessment/Plan   Diagnoses and all orders for this visit:    Borderline personality disorder (CMS/HCC)    Generalized anxiety disorder    Severe episode of recurrent major depressive disorder, without psychotic features  (CMS/[facility])    Chronic post-traumatic stress disorder (PTSD)    Sleeping difficulty      (Patient currently does not need refills)  I spent a total of  25 minutes in direct patient care, greater than 15 minutes (greater than 50%) were spent in coordination of care, and counseling the patient regarding borderline personality disorder and her recent self-harm.. Answered any questions patient had with medication and plan.  Discussed with patient in great length in detail regarding borderline personality disorder and how all focus and attention cannot be shifted on her at all times and if it does shift then that is not a reason for her to self-harm as she needs to find another outlet.  Patient states that she does have a job interview.  Praised and encouraged patient to follow-up with a job interview also informed patient that she needs to find another alternative and activity to help with her restlessness and anger.    Patient has now failed and tried risperidone, Adderall, citalopram, escitalopram, trazodone, sertraline, Trileptal, Prozac, Wellbutrin, amitriptyline, aripiprazole.       -Continue lamotrigine 100 mg daily for BPD and MDD.   -Continue BuSpar 10 mg twice daily for anxiety.    -Continue Minipress 4 mg at night for nightmares.    -Continue Seroquel as it caused patient to have worsening symptoms and hallucinations.    Discussed the risks, benefits, and side effects of the medications; patient ackowledged and verbally consented.  Patient is aware to call the clinic with any worsening of symptoms.  Patient is agreeable to call 911 or go to the nearest ER should he/she begin having SI/HI. Patient was strongly encouraged to continue birth control.  Patient was counseled regarding need not to become pregnant prior to discussion and possible titration and discontinuation of medications.  An explanation was provided to the patient regarding the risk of fetal harm with psychotrophic medications.  Patient was provided  education regarding both risk of continuing and discontinuing medications during pregnancy.  Patient verbalized understanding. We will continue Lamictal in an effort to stabilize mood.  The patient was reminded to immediately come to the hospital should there be any loss of control.  Explanation was given to her regarding Lamictal and the potential for Dylan Stoney syndrome and significant rash.  Patient was encouraged to check skin prior to beginning.  Patient was encouraged to report any rash and to immediately stop medication.     Patient was encouraged to not have fire arms in the home. If firearms are present they need to be locked and secured and bullets in separate areas. The patient was also encouraged if suicidal thoughts are present then she should, call the clinic and report to the ER for psychiatric evaluation. Mother also states she has rechecked everything to make sure all knives and razor or any sharp objects are from the home.       Patient will follow-up in roughly 2 weeks, encourage patient and the mother that if they had any questions or concerns or worsening symptoms to contact the clinic both verbalized and agreed.     Short-term goals: Patient will adhere to medication regimen above and note a decrease in anxiety over the next 2-4 weeks.  Long-term goals: Patient will continue with medication regimen and therapy and note an improvement in symptoms and daily functioning over the next 6 months.  Prognosis: Guarded dependent on medication/follow up and treatment plan compliance.   Functionality: pt showing improvements in important areas of daily functioning, but still struggling with BPD and independence.     2:00-2:20 pm 20 minutes spent on SUPPORTIVE PSYCHOTHERAPY and CBT: continuing efforts to promote the therapeutic alliance, address the patient’s issues, and strengthen self awareness, insights, and coping skills.  Identified and explained to the patient regarding why she self-harm due to  her BPD.  Also discussed with patient that I feel that her job would be great for her especially working in the public so she can understand and realize that she is going to have to deal with others issues and concerns before her own so she can apply them to being at home.  Instructed the patient regarding her age and that she needs to be more accountable.  Also discussed this with mother as she reports she was going to let them know during her interview regarding her learning disabilities but instructed her to let the patient do this as she is 20 years old and needs to take more accountability and actions.  Had the patient to print off a sleep journal to assess her sleep needs also encouraged her if she was active and working it may help with her sleep as well.         Errors in dictation may reflect use of voice recognition software and not all errors in transcription may have been detected prior to signing.

## 2020-10-19 ENCOUNTER — TELEMEDICINE (OUTPATIENT)
Dept: PSYCHIATRY | Facility: CLINIC | Age: 20
End: 2020-10-19

## 2020-10-19 DIAGNOSIS — G47.9 SLEEPING DIFFICULTY: ICD-10-CM

## 2020-10-19 DIAGNOSIS — F60.3 BORDERLINE PERSONALITY DISORDER (HCC): Primary | ICD-10-CM

## 2020-10-19 DIAGNOSIS — F43.12 CHRONIC POST-TRAUMATIC STRESS DISORDER (PTSD): ICD-10-CM

## 2020-10-19 DIAGNOSIS — F33.2 SEVERE EPISODE OF RECURRENT MAJOR DEPRESSIVE DISORDER, WITHOUT PSYCHOTIC FEATURES (HCC): ICD-10-CM

## 2020-10-19 DIAGNOSIS — F41.1 GENERALIZED ANXIETY DISORDER: ICD-10-CM

## 2020-10-19 PROCEDURE — 99213 OFFICE O/P EST LOW 20 MIN: CPT | Performed by: NURSE PRACTITIONER

## 2020-10-19 RX ORDER — LAMOTRIGINE 100 MG/1
100 TABLET ORAL DAILY
Qty: 30 TABLET | Refills: 0 | Status: SHIPPED | OUTPATIENT
Start: 2020-10-19 | End: 2020-11-18 | Stop reason: SDUPTHER

## 2020-10-19 RX ORDER — PRAZOSIN HYDROCHLORIDE 2 MG/1
4 CAPSULE ORAL NIGHTLY
Qty: 60 CAPSULE | Refills: 0 | Status: SHIPPED | OUTPATIENT
Start: 2020-10-19 | End: 2020-11-18 | Stop reason: SDUPTHER

## 2020-10-19 RX ORDER — BUSPIRONE HYDROCHLORIDE 10 MG/1
10 TABLET ORAL 3 TIMES DAILY
Qty: 90 TABLET | Refills: 0 | Status: SHIPPED | OUTPATIENT
Start: 2020-10-19 | End: 2020-11-18 | Stop reason: SDUPTHER

## 2020-10-19 NOTE — PROGRESS NOTES
"Minerva Dubose is a 20 y.o. female who is being seen for an urgent appointment today.     This provider is located at [facility], [address] [place] [address], [place] [address]. The Patient is seen remotely at home [address] [place] [address], using [employer]. Patient is being seen via telehealth and confirm that they are in a secure environment for this session. The patient's condition being diagnosed/treated is appropriate for telemedicine. The provider identified himself/herself: herself as well as her credentials.   The mother and patient gave consent to be seen remotely, and when consent is given they understand that the consent allows for patient identifiable information to be sent to a third party as needed.   They may refuse to be seen remotely at any time. The electronic data is encrypted and password protected, and the patient has been advised of the potential risks to privacy not withstanding such measures.    You have chosen to receive care through a telehealth visit.  Do you consent to use a video/audio connection for your medical care today? Yes        Chief Complaint: recent self harm    History of Present Illness   Patient presents today via mychart by herself as mother didn't feel as if she needed to be present during the visit as she reports things have been going well. Patient states her depression is ok as she states its just \"me overthinking and being over dramatic\" Patient reports her mood has been good with no concerns or issues.  Patient reports her anxiety has been manageable.  Patient states that when she went for the job interview and thought it went well and is hoping she gets the job.  Patient states that she is going to therapy and taking her medication as prescribed and denies any side effects or rash.  Patient denies any self-harm or any thoughts to self-harm as she notes that she believes she is doing well.  Patient's affect appears " happy.  Patient reports that her sleep is still up and down and she may get 8 to 9 hours 1 night and then not any the next night but states she is forgot to do the sleep journal.  Reports appetite is good.  Patient denies any SI/HI/AH/VH.        The following portions of the patient's history were reviewed and updated as appropriate: allergies, current medications, past family history, past medical history, past social history, past surgical history and problem list.    Review of Systems   Constitutional: Negative for appetite change.   Neurological: Negative for dizziness.   Psychiatric/Behavioral: Positive for sleep disturbance. Negative for agitation, behavioral problems, dysphoric mood, hallucinations and self-injury. The patient is not nervous/anxious.        Objective   Physical Exam   Constitutional: She appears well-developed. She is cooperative.   Abdominal: Normal appearance.   Neurological: She is alert.   Psychiatric: Her speech is normal. Mood, memory, affect and thought content normal. Her mood appears not anxious. Her affect is not angry and not labile. Thought content is not paranoid. She expresses impulsivity. She does not exhibit a depressed mood. She is attentive.   Nursing note reviewed.    There were no vitals taken for this visit. There is no height or weight on file to calculate BMI. Due to extenuating circumstances and possible current health risks associated with the patient being present in a clinical setting (with current health restrictions in place in regards to possible COVID 19 transmission/exposure), the patient was seen remotely today via a MyChart Video Visit through [employer].  Unable to obtain vital signs due to nature of remote visit.  Height stated at 65 inches.  Weight stated at 133 pounds.        No Known Allergies    Current Medications:   Current Outpatient Medications   Medication Sig Dispense Refill   • busPIRone (BUSPAR) 10 MG tablet Take 1 tablet by mouth 3 (Three) Times a  Day. 90 tablet 0   • lamoTRIgine (LaMICtal) 100 MG tablet Take 1 tablet by mouth Daily. 30 tablet 0   • levonorgestrel-ethinyl estradiol (LESSINA) 0.1-20 MG-MCG per tablet Take 1 tablet by mouth Daily.     • prazosin (MINIPRESS) 2 MG capsule Take 2 capsules by mouth Every Night. 60 capsule 0     No current facility-administered medications for this visit.        Mental Status Exam:   Hygiene:   good  Cooperation: Cooperative   Eye Contact: Good  Psychomotor Behavior: Appropriate   Affect:  Full range  Hopelessness: Denies  Speech:  Normal  Thought Process:  Goal directed  Thought Content:  Mood congruent   Suicidal:  None  Homicidal:  None  Hallucinations:  None  Delusion:  None  Memory:  Intact  Orientation:  Person, Place, Time and Situation  Reliability:  Good   Insight:  Fair  Judgement:  Fair  Impulse Control:  Fair  Physical/Medical Issues:  No     Assessment/Plan   Diagnoses and all orders for this visit:    1. Borderline personality disorder (CMS/HCC) (Primary)  -     lamoTRIgine (LaMICtal) 100 MG tablet; Take 1 tablet by mouth Daily.  Dispense: 30 tablet; Refill: 0    2. Generalized anxiety disorder  -     busPIRone (BUSPAR) 10 MG tablet; Take 1 tablet by mouth 3 (Three) Times a Day.  Dispense: 90 tablet; Refill: 0  -     lamoTRIgine (LaMICtal) 100 MG tablet; Take 1 tablet by mouth Daily.  Dispense: 30 tablet; Refill: 0    3. Chronic post-traumatic stress disorder (PTSD)  -     busPIRone (BUSPAR) 10 MG tablet; Take 1 tablet by mouth 3 (Three) Times a Day.  Dispense: 90 tablet; Refill: 0  -     lamoTRIgine (LaMICtal) 100 MG tablet; Take 1 tablet by mouth Daily.  Dispense: 30 tablet; Refill: 0  -     prazosin (MINIPRESS) 2 MG capsule; Take 2 capsules by mouth Every Night.  Dispense: 60 capsule; Refill: 0    4. Severe episode of recurrent major depressive disorder, without psychotic features (CMS/HCC)  -     lamoTRIgine (LaMICtal) 100 MG tablet; Take 1 tablet by mouth Daily.  Dispense: 30 tablet; Refill:  0    5. Sleeping difficulty        Discussed medication options and any new changes. Answered any questions patient had with medication and plan.    Patient has now failed and tried risperidone, Adderall, citalopram, escitalopram, trazodone, sertraline, Trileptal, Prozac, Wellbutrin, amitriptyline, aripiprazole, and seroquel.      -Continue lamotrigine 100 mg daily for BPD and MDD.   -Continue BuSpar 10 mg twice daily for anxiety.    -Continue Minipress 4 mg at night for nightmares.      Discussed the risks, benefits, and side effects of the medications; patient ackowledged and verbally consented.  Patient is aware to call the clinic with any worsening of symptoms.  Patient is agreeable to call 911 or go to the nearest ER should he/she begin having SI/HI. Patient was strongly encouraged to continue birth control.  Patient was counseled regarding need not to become pregnant prior to discussion and possible titration and discontinuation of medications.  An explanation was provided to the patient regarding the risk of fetal harm with psychotrophic medications.  Patient was provided education regarding both risk of continuing and discontinuing medications during pregnancy.  Patient verbalized understanding. We will continue Lamictal in an effort to stabilize mood.  The patient was reminded to immediately come to the hospital should there be any loss of control.  Explanation was given to her regarding Lamictal and the potential for Dylan Stoney syndrome and significant rash.  Patient was encouraged to check skin prior to beginning.  Patient was encouraged to report any rash and to immediately stop medication.     Patient was encouraged to not have fire arms in the home. If firearms are present they need to be locked and secured and bullets in separate areas. The patient was also encouraged if suicidal thoughts are present then she should, call the clinic and report to the ER for psychiatric evaluation. Mother also  states she has rechecked everything to make sure all knives and razor or any sharp objects are from the home.       Patient will follow-up in roughly 4 weeks, encourage patient and the mother that if they had any questions or concerns or worsening symptoms to contact the clinic both verbalized and agreed.     Short-term goals: Patient will adhere to medication regimen above and note a decrease in anxiety over the next 4 weeks.  Long-term goals: Patient will continue with medication regimen and therapy and note an improvement in symptoms and daily functioning over the next 6 months.  Prognosis: Guarded dependent on medication/follow up and treatment plan compliance.   Functionality: pt showing improvements in important areas of daily functioning.          Errors in dictation may reflect use of voice recognition software and not all errors in transcription may have been detected prior to signing.

## 2020-11-18 ENCOUNTER — TELEMEDICINE (OUTPATIENT)
Dept: PSYCHIATRY | Facility: CLINIC | Age: 20
End: 2020-11-18

## 2020-11-18 DIAGNOSIS — F43.12 CHRONIC POST-TRAUMATIC STRESS DISORDER (PTSD): ICD-10-CM

## 2020-11-18 DIAGNOSIS — F41.1 GENERALIZED ANXIETY DISORDER: ICD-10-CM

## 2020-11-18 DIAGNOSIS — F33.1 MODERATE EPISODE OF RECURRENT MAJOR DEPRESSIVE DISORDER (HCC): ICD-10-CM

## 2020-11-18 DIAGNOSIS — F60.3 BORDERLINE PERSONALITY DISORDER (HCC): ICD-10-CM

## 2020-11-18 PROCEDURE — 99214 OFFICE O/P EST MOD 30 MIN: CPT | Performed by: NURSE PRACTITIONER

## 2020-11-18 RX ORDER — PRAZOSIN HYDROCHLORIDE 2 MG/1
4 CAPSULE ORAL NIGHTLY
Qty: 60 CAPSULE | Refills: 0 | Status: SHIPPED | OUTPATIENT
Start: 2020-11-18 | End: 2020-12-16 | Stop reason: SDUPTHER

## 2020-11-18 RX ORDER — BUSPIRONE HYDROCHLORIDE 10 MG/1
10 TABLET ORAL 3 TIMES DAILY
Qty: 90 TABLET | Refills: 0 | Status: SHIPPED | OUTPATIENT
Start: 2020-11-18 | End: 2020-12-16 | Stop reason: SDUPTHER

## 2020-11-18 RX ORDER — LAMOTRIGINE 150 MG/1
150 TABLET ORAL DAILY
Qty: 30 TABLET | Refills: 0 | Status: SHIPPED | OUTPATIENT
Start: 2020-11-18 | End: 2020-12-16 | Stop reason: SDUPTHER

## 2020-11-18 NOTE — PROGRESS NOTES
"Subjective   Samantha Dubose is a 20 y.o. female who is being seen for an urgent appointment today.     This provider is located at [facility], [address] [place] [address], [place] [address]. The Patient is seen remotely at home [address] [place] [address], using [employer]. Patient is being seen via telehealth and confirm that they are in a secure environment for this session. The patient's condition being diagnosed/treated is appropriate for telemedicine. The provider identified himself/herself: herself as well as her credentials.   The mother and patient gave consent to be seen remotely, and when consent is given they understand that the consent allows for patient identifiable information to be sent to a third party as needed.   They may refuse to be seen remotely at any time. The electronic data is encrypted and password protected, and the patient has been advised of the potential risks to privacy not withstanding such measures.    You have chosen to receive care through a telehealth visit.  Do you consent to use a video/audio connection for your medical care today? Yes        Chief Complaint: Depression and irritability     History of Present Illness   Patient is seen today via my chart alone as she states that she has been more sad lately but \"I do not want to talk about it it is more personal issues that you cannot help with\".  Patient states that she has felt more sad and depressed lately as well as occasional hopelessness and helplessness but states it comes and goes based on situations.  Patient does note however that she has been more irritable and agitated lately.  She states my mother would state that I have been more hateful and irritable and pissed off.  Patient states that her anxiety is fine with no issues.  She reports that she is still getting roughly 7 hours of sleep as she states it is just the scheduled time of her sleep.  Patient reports that she did not get the " job as she is going to wait on applying to other jobs.  She also notes that she is not working on her GED if she needs help in math.  Patient reports her appetite is been good as she is eating muffins while I speak with her.  Patient denies any other symptoms or any medical or medicine changes.  Patient denies any side effects or rash in the medicine.  Patient denies any self-harm.  Patient denies any SI/HI/AH/VH.        The following portions of the patient's history were reviewed and updated as appropriate: allergies, current medications, past family history, past medical history, past social history, past surgical history and problem list.    Review of Systems   Constitutional: Negative for appetite change.   Neurological: Negative for dizziness.   Psychiatric/Behavioral: Positive for agitation, dysphoric mood and sleep disturbance. Negative for behavioral problems, hallucinations and self-injury. The patient is not nervous/anxious.    All other systems reviewed and are negative.      Objective   Physical Exam   Constitutional: She appears well-developed. She is cooperative.   Abdominal: Normal appearance.   Neurological: She is alert.   Psychiatric: Her speech is normal. Memory, affect and thought content normal. Her mood appears not anxious. Her affect is not angry and not labile. She is agitated. Thought content is not paranoid. She expresses impulsivity. She exhibits a depressed mood. She is attentive.   Nursing note reviewed.    There were no vitals taken for this visit. There is no height or weight on file to calculate BMI. Due to extenuating circumstances and possible current health risks associated with the patient being present in a clinical setting (with current health restrictions in place in regards to possible COVID 19 transmission/exposure), the patient was seen remotely today via a [employer] Video Visit through [employer].  Unable to obtain vital signs due to nature of remote visit.  Height stated at 65  inches.  Weight stated at 133 pounds.        No Known Allergies    Current Medications:   Current Outpatient Medications   Medication Sig Dispense Refill   • busPIRone (BUSPAR) 10 MG tablet Take 1 tablet by mouth 3 (Three) Times a Day. 90 tablet 0   • lamoTRIgine (LaMICtal) 150 MG tablet Take 1 tablet by mouth Daily. 30 tablet 0   • levonorgestrel-ethinyl estradiol (LESSINA) 0.1-20 MG-MCG per tablet Take 1 tablet by mouth Daily.     • prazosin (MINIPRESS) 2 MG capsule Take 2 capsules by mouth Every Night. 60 capsule 0     No current facility-administered medications for this visit.        Mental Status Exam:   Hygiene:   good  Cooperation: Cooperative   Eye Contact: Good  Psychomotor Behavior: Appropriate   Affect:  Full range  Hopelessness: Denies  Speech:  Normal  Thought Process:  Goal directed  Thought Content:  Mood congruent   Suicidal:  None  Homicidal:  None  Hallucinations:  None  Delusion:  None  Memory:  Intact  Orientation:  Person, Place, Time and Situation  Reliability:  Good   Insight:  Fair  Judgement:  Fair  Impulse Control:  Fair  Physical/Medical Issues:  No     Assessment/Plan   Diagnoses and all orders for this visit:    1. Borderline personality disorder (CMS/HCC)  -     lamoTRIgine (LaMICtal) 150 MG tablet; Take 1 tablet by mouth Daily.  Dispense: 30 tablet; Refill: 0    2. Moderate episode of recurrent major depressive disorder (CMS/HCC)  -     lamoTRIgine (LaMICtal) 150 MG tablet; Take 1 tablet by mouth Daily.  Dispense: 30 tablet; Refill: 0    3. Generalized anxiety disorder  -     busPIRone (BUSPAR) 10 MG tablet; Take 1 tablet by mouth 3 (Three) Times a Day.  Dispense: 90 tablet; Refill: 0  -     lamoTRIgine (LaMICtal) 150 MG tablet; Take 1 tablet by mouth Daily.  Dispense: 30 tablet; Refill: 0    4. Chronic post-traumatic stress disorder (PTSD)  -     busPIRone (BUSPAR) 10 MG tablet; Take 1 tablet by mouth 3 (Three) Times a Day.  Dispense: 90 tablet; Refill: 0  -     prazosin (MINIPRESS)  2 MG capsule; Take 2 capsules by mouth Every Night.  Dispense: 60 capsule; Refill: 0  -     lamoTRIgine (LaMICtal) 150 MG tablet; Take 1 tablet by mouth Daily.  Dispense: 30 tablet; Refill: 0        I spent a total of  25 minutes in direct patient care, greater than 15 minutes (greater than 50%) were spent in coordination of care, and counseling the patient regarding her mood as well as depression and also encouraged the patient to talk with me as if she does not explain what is going on I cannot help her effectively. Answered any questions patient had with medication and plan.   Patient has now failed and tried risperidone, Adderall, citalopram, escitalopram, trazodone, sertraline, Trileptal, Prozac, Wellbutrin, amitriptyline, aripiprazole, and seroquel.      -Increase lamotrigine to 150 mg daily for BPD and MDD.   -Continue BuSpar 10 mg twice daily for anxiety.    -Continue Minipress 4 mg at night for nightmares.      Discussed the risks, benefits, and side effects of the medications; patient ackowledged and verbally consented.  Patient is aware to call the clinic with any worsening of symptoms.  Patient is agreeable to call 911 or go to the nearest ER should he/she begin having SI/HI. Patient was strongly encouraged to continue birth control.  Patient was counseled regarding need not to become pregnant prior to discussion and possible titration and discontinuation of medications.  An explanation was provided to the patient regarding the risk of fetal harm with psychotrophic medications.  Patient was provided education regarding both risk of continuing and discontinuing medications during pregnancy.  Patient verbalized understanding. We will increase Lamictal in an effort to stabilize mood.  The patient was reminded to immediately come to the hospital should there be any loss of control.  Explanation was given to her regarding Lamictal and the potential for Dylan Stoney syndrome and significant rash.  Patient  was encouraged to check skin prior to beginning.  Patient was encouraged to report any rash and to immediately stop medication.     Encouraged patient to continue following with her therapist.  Also discussed with patient finding another job as well as working on her GED.    Patient will follow-up in roughly 4 weeks, encourage patient and the mother that if they had any questions or concerns or worsening symptoms to contact the clinic both verbalized and agreed.     Short-term goals: Patient will adhere to medication regimen above and note a decrease in anxiety over the next 4 weeks.  Long-term goals: Patient will continue with medication regimen and therapy and note an improvement in symptoms and daily functioning over the next 6 months.  Prognosis: Guarded dependent on medication/follow up and treatment plan compliance.   Functionality: pt showing improvements in important areas of daily functioning, but struggling with her depression and mood.    Errors in dictation may reflect use of voice recognition software and not all errors in transcription may have been detected prior to signing.

## 2020-12-16 ENCOUNTER — TELEMEDICINE (OUTPATIENT)
Dept: PSYCHIATRY | Facility: CLINIC | Age: 20
End: 2020-12-16

## 2020-12-16 DIAGNOSIS — F33.1 MODERATE EPISODE OF RECURRENT MAJOR DEPRESSIVE DISORDER (HCC): ICD-10-CM

## 2020-12-16 DIAGNOSIS — F43.12 CHRONIC POST-TRAUMATIC STRESS DISORDER (PTSD): ICD-10-CM

## 2020-12-16 DIAGNOSIS — F60.3 BORDERLINE PERSONALITY DISORDER (HCC): ICD-10-CM

## 2020-12-16 DIAGNOSIS — F41.1 GENERALIZED ANXIETY DISORDER: ICD-10-CM

## 2020-12-16 PROCEDURE — 99213 OFFICE O/P EST LOW 20 MIN: CPT | Performed by: NURSE PRACTITIONER

## 2020-12-16 RX ORDER — BUSPIRONE HYDROCHLORIDE 10 MG/1
10 TABLET ORAL 3 TIMES DAILY
Qty: 90 TABLET | Refills: 1 | Status: SHIPPED | OUTPATIENT
Start: 2020-12-16 | End: 2021-01-13 | Stop reason: SDUPTHER

## 2020-12-16 RX ORDER — PRAZOSIN HYDROCHLORIDE 2 MG/1
4 CAPSULE ORAL NIGHTLY
Qty: 60 CAPSULE | Refills: 1 | Status: SHIPPED | OUTPATIENT
Start: 2020-12-16 | End: 2021-01-13 | Stop reason: SDUPTHER

## 2020-12-16 RX ORDER — LAMOTRIGINE 150 MG/1
150 TABLET ORAL DAILY
Qty: 30 TABLET | Refills: 1 | Status: SHIPPED | OUTPATIENT
Start: 2020-12-16 | End: 2021-01-13 | Stop reason: SDUPTHER

## 2020-12-16 NOTE — PROGRESS NOTES
"Minerva Dubose is a 20 y.o. female who is being seen for an urgent appointment today.     This provider is located at [facility], [address] [place] [address], [place] [address]. The Patient is seen remotely at home [address] [place] [address], using [employer]. Patient is being seen via telehealth and confirm that they are in a secure environment for this session. The patient's condition being diagnosed/treated is appropriate for telemedicine. The provider identified himself/herself: herself as well as her credentials.   The mother and patient gave consent to be seen remotely, and when consent is given they understand that the consent allows for patient identifiable information to be sent to a third party as needed.   They may refuse to be seen remotely at any time. The electronic data is encrypted and password protected, and the patient has been advised of the potential risks to privacy not withstanding such measures.    You have chosen to receive care through a telehealth visit.  Do you consent to use a video/audio connection for your medical care today? Yes        Chief Complaint: Follow-up for mood    History of Present Illness   Patient presents today stating that she has been in a good mood as she has been \"hyper\".  Patient states that she has not had any anger episodes and she has been \"a good daughter\".  Patient denies any depressive or anxiety symptoms.  Patient reports that she was sick with a severe headache and body aches for a few days but then she got on a better sleep schedule and she is sleeping roughly 8 hours and going to bed early and waking up up early.  Patient states that she has been painting and listening to [employer] music.  She denies any anger episodes.  Patient denies any side effects to the medications or self-harm.  Patient denies any SI/HI/AH/VH.        The following portions of the patient's history were reviewed and updated as " appropriate: allergies, current medications, past family history, past medical history, past social history, past surgical history and problem list.    Review of Systems   Constitutional: Negative for appetite change.   Neurological: Negative for dizziness.   Psychiatric/Behavioral: Negative for agitation, behavioral problems, dysphoric mood, hallucinations, self-injury and sleep disturbance. The patient is hyperactive. The patient is not nervous/anxious.    All other systems reviewed and are negative.      Objective   Physical Exam   Constitutional: She appears well-developed. She is cooperative.   Abdominal: Normal appearance.   Neurological: She is alert.   Psychiatric: Her speech is normal and behavior is normal. Mood, memory, affect and thought content normal. Her mood appears not anxious. Her affect is not angry and not labile. She is not agitated. Thought content is not paranoid. She does not express impulsivity. She does not exhibit a depressed mood. She is attentive.   Nursing note reviewed.    There were no vitals taken for this visit. There is no height or weight on file to calculate BMI. Due to extenuating circumstances and possible current health risks associated with the patient being present in a clinical setting (with current health restrictions in place in regards to possible COVID 19 transmission/exposure), the patient was seen remotely today via a MyChart Video Visit through [employer].  Unable to obtain vital signs due to nature of remote visit.  Height stated at 65 inches.  Weight stated at 133 pounds.        No Known Allergies    Current Medications:   Current Outpatient Medications   Medication Sig Dispense Refill   • busPIRone (BUSPAR) 10 MG tablet Take 1 tablet by mouth 3 (Three) Times a Day. 90 tablet 1   • lamoTRIgine (LaMICtal) 150 MG tablet Take 1 tablet by mouth Daily. 30 tablet 1   • levonorgestrel-ethinyl estradiol (LESSINA) 0.1-20 MG-MCG per tablet Take 1 tablet by mouth Daily.     •  prazosin (MINIPRESS) 2 MG capsule Take 2 capsules by mouth Every Night. 60 capsule 1     No current facility-administered medications for this visit.        Mental Status Exam:   Hygiene:   good  Cooperation: Cooperative   Eye Contact: Good  Psychomotor Behavior: Appropriate   Affect:  Full range  Hopelessness: Denies  Speech:  Normal  Thought Process:  Goal directed  Thought Content:  Mood congruent   Suicidal:  None  Homicidal:  None  Hallucinations:  None  Delusion:  None  Memory:  Intact  Orientation:  Person, Place, Time and Situation  Reliability:  Good   Insight:  Fair  Judgement:  Fair  Impulse Control:  Fair  Physical/Medical Issues:  No     Assessment/Plan   Diagnoses and all orders for this visit:    1. Generalized anxiety disorder  -     busPIRone (BUSPAR) 10 MG tablet; Take 1 tablet by mouth 3 (Three) Times a Day.  Dispense: 90 tablet; Refill: 1  -     lamoTRIgine (LaMICtal) 150 MG tablet; Take 1 tablet by mouth Daily.  Dispense: 30 tablet; Refill: 1    2. Chronic post-traumatic stress disorder (PTSD)  -     busPIRone (BUSPAR) 10 MG tablet; Take 1 tablet by mouth 3 (Three) Times a Day.  Dispense: 90 tablet; Refill: 1  -     lamoTRIgine (LaMICtal) 150 MG tablet; Take 1 tablet by mouth Daily.  Dispense: 30 tablet; Refill: 1  -     prazosin (MINIPRESS) 2 MG capsule; Take 2 capsules by mouth Every Night.  Dispense: 60 capsule; Refill: 1    3. Moderate episode of recurrent major depressive disorder (CMS/HCC)  -     lamoTRIgine (LaMICtal) 150 MG tablet; Take 1 tablet by mouth Daily.  Dispense: 30 tablet; Refill: 1    4. Borderline personality disorder (CMS/HCC)  -     lamoTRIgine (LaMICtal) 150 MG tablet; Take 1 tablet by mouth Daily.  Dispense: 30 tablet; Refill: 1        Discussed medication options as well as any side effects in her mood.  Informed patient that as long as she was sleeping well even though she reported hyperactivity and was in a good mood but there was no concerns or issues at this time as  she stated things have been going well.  Answered any questions patient had with medication and plan.   Patient has now failed and tried risperidone, Adderall, citalopram, escitalopram, trazodone, sertraline, Trileptal, Prozac, Wellbutrin, amitriptyline, aripiprazole, and seroquel.      -Continue lamotrigine  150 mg daily for BPD and MDD.   -Continue BuSpar 10 mg twice daily for anxiety.    -Continue Minipress 4 mg at night for nightmares.      Discussed the risks, benefits, and side effects of the medications; patient ackowledged and verbally consented.  Patient is aware to call the clinic with any worsening of symptoms.  Patient is agreeable to call 911 or go to the nearest ER should he/she begin having SI/HI. Patient was strongly encouraged to continue birth control.  Patient was counseled regarding need not to become pregnant prior to discussion and possible titration and discontinuation of medications.  An explanation was provided to the patient regarding the risk of fetal harm with psychotrophic medications.  Patient was provided education regarding both risk of continuing and discontinuing medications during pregnancy.  Patient verbalized understanding. We will increase Lamictal in an effort to stabilize mood.  The patient was reminded to immediately come to the hospital should there be any loss of control.  Explanation was given to her regarding Lamictal and the potential for Dylan Stoney syndrome and significant rash.  Patient was encouraged to check skin prior to beginning.  Patient was encouraged to report any rash and to immediately stop medication.     Encouraged patient to continue following with her therapist.     Patient will follow-up in roughly 4 weeks, encourage patient and the mother that if they had any questions or concerns or worsening symptoms to contact the clinic both verbalized and agreed.     Short-term goals: Patient will adhere to medication regimen above and note a decrease in  anxiety over the next 4 weeks.  Long-term goals: Patient will continue with medication regimen and therapy and note an improvement in symptoms and daily functioning over the next 6 months.  Prognosis: Guarded dependent on medication/follow up and treatment plan compliance.   Functionality: pt showing improvements in important areas of daily functioning, but struggling with her depression and mood.    Errors in dictation may reflect use of voice recognition software and not all errors in transcription may have been detected prior to signing.

## 2020-12-24 DIAGNOSIS — F43.12 CHRONIC POST-TRAUMATIC STRESS DISORDER (PTSD): ICD-10-CM

## 2020-12-24 DIAGNOSIS — F41.1 GENERALIZED ANXIETY DISORDER: ICD-10-CM

## 2020-12-24 DIAGNOSIS — F60.3 BORDERLINE PERSONALITY DISORDER (HCC): ICD-10-CM

## 2020-12-24 DIAGNOSIS — F33.2 SEVERE EPISODE OF RECURRENT MAJOR DEPRESSIVE DISORDER, WITHOUT PSYCHOTIC FEATURES (HCC): ICD-10-CM

## 2020-12-24 RX ORDER — LAMOTRIGINE 100 MG/1
100 TABLET ORAL DAILY
Qty: 30 TABLET | Refills: 0 | OUTPATIENT
Start: 2020-12-24

## 2020-12-24 NOTE — TELEPHONE ENCOUNTER
Refill not appropriate. Medication changes made 12/16/20 by LESLIE Resendiz. New rx provided at that visit.

## 2021-01-13 ENCOUNTER — TELEMEDICINE (OUTPATIENT)
Dept: PSYCHIATRY | Facility: CLINIC | Age: 21
End: 2021-01-13

## 2021-01-13 DIAGNOSIS — F41.1 GENERALIZED ANXIETY DISORDER: ICD-10-CM

## 2021-01-13 DIAGNOSIS — F43.12 CHRONIC POST-TRAUMATIC STRESS DISORDER (PTSD): ICD-10-CM

## 2021-01-13 DIAGNOSIS — F33.1 MODERATE EPISODE OF RECURRENT MAJOR DEPRESSIVE DISORDER (HCC): ICD-10-CM

## 2021-01-13 DIAGNOSIS — F60.3 BORDERLINE PERSONALITY DISORDER (HCC): Primary | ICD-10-CM

## 2021-01-13 PROCEDURE — 99213 OFFICE O/P EST LOW 20 MIN: CPT | Performed by: NURSE PRACTITIONER

## 2021-01-13 RX ORDER — PRAZOSIN HYDROCHLORIDE 2 MG/1
4 CAPSULE ORAL NIGHTLY
Qty: 60 CAPSULE | Refills: 1 | Status: SHIPPED | OUTPATIENT
Start: 2021-01-13 | End: 2021-03-10 | Stop reason: SDUPTHER

## 2021-01-13 RX ORDER — BUSPIRONE HYDROCHLORIDE 10 MG/1
10 TABLET ORAL 3 TIMES DAILY
Qty: 90 TABLET | Refills: 1 | Status: SHIPPED | OUTPATIENT
Start: 2021-01-13 | End: 2021-03-10 | Stop reason: SDUPTHER

## 2021-01-13 RX ORDER — LAMOTRIGINE 150 MG/1
150 TABLET ORAL DAILY
Qty: 30 TABLET | Refills: 1 | Status: SHIPPED | OUTPATIENT
Start: 2021-01-13 | End: 2021-03-10 | Stop reason: SDUPTHER

## 2021-01-13 NOTE — PROGRESS NOTES
"This provider is located at [facility], [address] [facility] [place] [address].The Patient is seen remotely at [address] [place], [place] [address] [address] via [employer]. Patient is being seen via telehealth and confirm that they are in a secure environment for this session. The patient's condition being diagnosed/treated is appropriate for telemedicine. The provider identified himself/herself: herself as well as her credentials.   The patient gave consent to be seen remotely, and when consent is given they understand that the consent allows for patient identifiable information to be sent to a third party as needed.   They may refuse to be seen remotely at any time. The electronic data is encrypted and password protected, and the patient has been advised of the potential risks to privacy not withstanding such measures.    You have chosen to receive care through a telehealth visit.  Do you consent to use a video/audio connection for your medical care today? Yes        Chief Complaint  Follow-up for mood     Subjective     {CC  Problem List  Visit Diagnosis   Encounters  Notes  Medications  Labs  Result Review Imaging  Media :23}     Samantha Dubose presents to [facility] for medication management.   History of Present Illness  Patient presents today via [employer] by herself reporting that she has been doing \"great\".  She states her mother has noted that she is a completely different person.  Patient states that she is much more happy and denies any depressive symptoms.  She states that she has had some irritability but overall it has not been significant.  Patient reports her appetite and sleep have been good.  She denies any side effects or rash to the medication.  Patient denies any significant anxiety symptoms that she states that has been better since the increase in lamotrigine.  Patient states it has been 3 months since she has self-harm and has " not had any thoughts.  Patient denies any SI/HI/AH/VH.        Objective   Vital Signs:   There were no vitals taken for this visit.  Due to the remote nature of this encounter (virtual encounter), vitals were unable to be obtained.  Height stated at 65 inches.  Weight stated at 133 pounds.        PHQ-9 Score:   PHQ-9 Total Score: 0     Mental Status Exam:   Hygiene:   good  Cooperation:  Cooperative  Eye Contact:  Good  Psychomotor Behavior:  Appropriate  Affect:  Appropriate  Mood: normal  Speech:  Normal  Thought Process:  Goal directed and Linear  Thought Content:  Normal  Suicidal:  None  Homicidal:  None  Hallucinations:  None  Delusion:  None  Memory:  Intact  Orientation:  Person, Place, Time and Situation  Reliability:  good  Insight:  Fair  Judgement:  Fair  Impulse Control:  Fair  Physical/Medical Issues:  No      Current Medications:   Current Outpatient Medications   Medication Sig Dispense Refill   • busPIRone (BUSPAR) 10 MG tablet Take 1 tablet by mouth 3 (Three) Times a Day. 90 tablet 1   • lamoTRIgine (LaMICtal) 150 MG tablet Take 1 tablet by mouth Daily. 30 tablet 1   • levonorgestrel-ethinyl estradiol (LESSINA) 0.1-20 MG-MCG per tablet Take 1 tablet by mouth Daily.     • prazosin (MINIPRESS) 2 MG capsule Take 2 capsules by mouth Every Night. 60 capsule 1     No current facility-administered medications for this visit.        Physical Exam   Result Review :            Patient refuses all labs.     Assessment and Plan    Problem List Items Addressed This Visit        Other    Generalized anxiety disorder (Chronic)    Relevant Medications    busPIRone (BUSPAR) 10 MG tablet    lamoTRIgine (LaMICtal) 150 MG tablet      Other Visit Diagnoses     Borderline personality disorder (CMS/HCC)    -  Primary    Relevant Medications    busPIRone (BUSPAR) 10 MG tablet    lamoTRIgine (LaMICtal) 150 MG tablet    Chronic post-traumatic stress disorder (PTSD)        Relevant Medications    busPIRone (BUSPAR) 10 MG  tablet    lamoTRIgine (LaMICtal) 150 MG tablet    prazosin (MINIPRESS) 2 MG capsule    Moderate episode of recurrent major depressive disorder (CMS/HCC)        Relevant Medications    busPIRone (BUSPAR) 10 MG tablet    lamoTRIgine (LaMICtal) 150 MG tablet            TREATMENT PLAN/GOALS: Continue supportive psychotherapy efforts and medications as indicated. Treatment and medication options discussed during today's visit. Patient ackowledged and verbally consented to continue with current treatment plan and was educated on the importance of compliance with treatment and follow-up appointments.    MEDICATION ISSUES:  We discussed risks, benefits, and side effects of the above medications and the patient was agreeable with the plan. Patient was educated on the importance of compliance with treatment and follow-up appointments.  Patient is agreeable to call the office with any worsening of symptoms or onset of side effects. Patient is agreeable to call 911 or go to the nearest ER should he/she begin having SI/HI. We will continue Lamictal in an effort to stabilize mood.  The patient was reminded to immediately come to the hospital should there be any loss of control.  Explanation was given to her regarding Lamictal and the potential for Dylan Stoney syndrome and significant rash.  Patient was encouraged to check skin prior to beginning.  Patient was encouraged to report any rash and to immediately stop medication.       Counseled patient regarding multimodal approach with healthy nutrition, healthy sleep, regular physical activity, social activities, counseling, and medications.      Coping skills reviewed and encouraged positive framing of thoughts     Assisted patient in processing above session content; acknowledged and normalized patient’s thoughts, feelings, and concerns.  Applied  positive coping skills and behavior management in session.  Allowed patient to freely discuss issues without interruption or  judgment. Provided safe, confidential environment to facilitate the development of positive therapeutic relationship and encourage open, honest communication. Assisted patient in identifying risk factors which would indicate the need for higher level of care including thoughts to harm self or others and/or self-harming behavior and encouraged patient to contact this office, call 911, or present to the nearest emergency room should any of these events occur. Discussed crisis intervention services and means to access.     MEDS ORDERED DURING VISIT:  New Medications Ordered This Visit   Medications   • busPIRone (BUSPAR) 10 MG tablet     Sig: Take 1 tablet by mouth 3 (Three) Times a Day.     Dispense:  90 tablet     Refill:  1     Increasing dose   • lamoTRIgine (LaMICtal) 150 MG tablet     Sig: Take 1 tablet by mouth Daily.     Dispense:  30 tablet     Refill:  1   • prazosin (MINIPRESS) 2 MG capsule     Sig: Take 2 capsules by mouth Every Night.     Dispense:  60 capsule     Refill:  1           Follow Up   Return in about 8 weeks (around 3/10/2021), or if symptoms worsen or fail to improve, for Recheck.    Patient was given instructions and counseling regarding her condition or for health maintenance advice. Please see specific information pulled into the AVS if appropriate.     This document has been electronically signed by LESLIE Martin  January 13, 2021 14:18 EST    Part of this note may be an electronic transcription/translation of spoken language to printed text using the Dragon Dictation System.

## 2021-03-10 ENCOUNTER — TELEMEDICINE (OUTPATIENT)
Dept: PSYCHIATRY | Facility: CLINIC | Age: 21
End: 2021-03-10

## 2021-03-10 DIAGNOSIS — F43.12 CHRONIC POST-TRAUMATIC STRESS DISORDER (PTSD): ICD-10-CM

## 2021-03-10 DIAGNOSIS — F60.3 BORDERLINE PERSONALITY DISORDER (HCC): Primary | Chronic | ICD-10-CM

## 2021-03-10 DIAGNOSIS — F33.1 MODERATE EPISODE OF RECURRENT MAJOR DEPRESSIVE DISORDER (HCC): Chronic | ICD-10-CM

## 2021-03-10 DIAGNOSIS — F41.1 GENERALIZED ANXIETY DISORDER: Chronic | ICD-10-CM

## 2021-03-10 PROCEDURE — 99214 OFFICE O/P EST MOD 30 MIN: CPT | Performed by: NURSE PRACTITIONER

## 2021-03-10 RX ORDER — LAMOTRIGINE 150 MG/1
150 TABLET ORAL DAILY
Qty: 30 TABLET | Refills: 1 | Status: SHIPPED | OUTPATIENT
Start: 2021-03-10 | End: 2021-05-05 | Stop reason: SDUPTHER

## 2021-03-10 RX ORDER — PRAZOSIN HYDROCHLORIDE 2 MG/1
4 CAPSULE ORAL NIGHTLY
Qty: 60 CAPSULE | Refills: 1 | Status: SHIPPED | OUTPATIENT
Start: 2021-03-10 | End: 2021-05-05 | Stop reason: SDUPTHER

## 2021-03-10 RX ORDER — BUSPIRONE HYDROCHLORIDE 10 MG/1
10 TABLET ORAL 3 TIMES DAILY
Qty: 90 TABLET | Refills: 1 | Status: SHIPPED | OUTPATIENT
Start: 2021-03-10 | End: 2021-05-05 | Stop reason: SDUPTHER
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
## 2021-05-05 ENCOUNTER — PRIOR AUTHORIZATION (OUTPATIENT)
Dept: PSYCHIATRY | Facility: CLINIC | Age: 21
End: 2021-05-05

## 2021-05-05 ENCOUNTER — TELEMEDICINE (OUTPATIENT)
Dept: PSYCHIATRY | Facility: CLINIC | Age: 21
End: 2021-05-05

## 2021-05-05 DIAGNOSIS — F60.3 BORDERLINE PERSONALITY DISORDER (HCC): Chronic | ICD-10-CM

## 2021-05-05 DIAGNOSIS — F43.12 CHRONIC POST-TRAUMATIC STRESS DISORDER (PTSD): ICD-10-CM

## 2021-05-05 DIAGNOSIS — F33.2 SEVERE EPISODE OF RECURRENT MAJOR DEPRESSIVE DISORDER, WITHOUT PSYCHOTIC FEATURES (HCC): Primary | ICD-10-CM

## 2021-05-05 DIAGNOSIS — F41.1 GENERALIZED ANXIETY DISORDER: Chronic | ICD-10-CM

## 2021-05-05 PROCEDURE — 99214 OFFICE O/P EST MOD 30 MIN: CPT | Performed by: NURSE PRACTITIONER

## 2021-05-05 RX ORDER — PRAZOSIN HYDROCHLORIDE 2 MG/1
4 CAPSULE ORAL NIGHTLY
Qty: 60 CAPSULE | Refills: 1 | Status: SHIPPED | OUTPATIENT
Start: 2021-05-05 | End: 2021-08-16

## 2021-05-05 RX ORDER — LAMOTRIGINE 150 MG/1
150 TABLET ORAL DAILY
Qty: 30 TABLET | Refills: 1 | Status: SHIPPED | OUTPATIENT
Start: 2021-05-05 | End: 2021-06-25 | Stop reason: SDUPTHER

## 2021-05-05 RX ORDER — BUSPIRONE HYDROCHLORIDE 10 MG/1
10 TABLET ORAL 3 TIMES DAILY
Qty: 90 TABLET | Refills: 1 | Status: SHIPPED | OUTPATIENT
Start: 2021-05-05 | End: 2021-08-13 | Stop reason: SDUPTHER

## 2021-05-05 RX ORDER — OLANZAPINE 5 MG/1
5 TABLET ORAL NIGHTLY
Qty: 30 TABLET | Refills: 0 | Status: SHIPPED | OUTPATIENT
Start: 2021-05-05 | End: 2021-07-01 | Stop reason: SDUPTHER

## 2021-05-05 NOTE — PROGRESS NOTES
This provider is located at [facility] [address] [facility] [place] [address].The Patient is seen remotely at [address] [place], [place] [address] [address] via [employer]. Patient is being seen via telehealth and confirm that they are in a secure environment for this session. The patient's condition being diagnosed/treated is appropriate for telemedicine. The provider identified himself/herself: herself as well as her credentials.   The patient gave consent to be seen remotely, and when consent is given they understand that the consent allows for patient identifiable information to be sent to a third party as needed.   They may refuse to be seen remotely at any time. The electronic data is encrypted and password protected, and the patient has been advised of the potential risks to privacy not withstanding such measures.    You have chosen to receive care through a telehealth visit.  Do you consent to use a video/audio connection for your medical care today? Yes        Chief Complaint  Follow-up for mood     Subjective          Samantha Dubose presents to [facility] for medication management.     History of Present Illness   Patient presents today via [employer] reporting that she is currently in quarantine that she tested positive for COVID after they came back from [facility].  Patient states she has had symptoms but overall doing okay just felt fatigued and gets out of quarantine on Saturday.  Patient states however she did have a good trip and has an elevated mood throughout the conversation and is laughing and smiling.  When asking patient how her mood and depression have been she gives to thumbs up and reports good and then states she believes her medicine is not working.  When asking the patient why she states she is trying to be honest as she is felt more down and depressed over the last 2 to 3 months. The patient reports depressive symptoms including  "depressed mood, insomnia, anhedonia, feelings of guilt, feelings of hopelessness, feelings of helplessness, low energy and difficulty concentrating, and have caused impairment in important areas of functioning.  Depression rated 9/10 with 10 being the worst.  Patient states that she has had occasional thoughts of suicidal ideation but adamantly denies any plan or intent.  Patient denies any self-harm and she states she has not done that and almost over 7 months.  Patient reports her sleep has been \"bad\".  Patient states she has been sleeping more the day because the virus but otherwise it takes her \"I while to go to sleep\".  Patient denies any side effects or nightmares or dizziness with the medications.  Patient states her appetite has actually been good.  Patient was laughing throughout the interview talking about a fall that she had in her recent beach trip which she states went well.  Patient denies any current SI/HI/AH/VH.      Objective   Vital Signs:   There were no vitals taken for this visit.  Due to the remote nature of this encounter (virtual encounter), vitals were unable to be obtained.  Height stated at 65 inches.  Weight stated at 133 pounds.      Patient screened positive for depression based on a PHQ-9 score of 14 on 5/5/2021. Follow-up recommendations include: see notes.    PHQ-9 Score:   PHQ-9 Total Score: 14     Mental Status Exam:   Hygiene:   good  Cooperation:  Cooperative  Eye Contact:  Good  Psychomotor Behavior:  Appropriate  Affect:  Appropriate  Mood: normal  Speech:  Normal  Thought Process:  Goal directed and Linear  Thought Content:  Mood incongruent  Suicidal:  None and Suicidal Ideation none at this time  Homicidal:  None  Hallucinations:  None  Delusion:  None  Memory:  Intact  Orientation:  Person, Place, Time and Situation  Reliability:  good  Insight:  Fair  Judgement:  Fair  Impulse Control:  Fair  Physical/Medical Issues:  No      Current Medications:   Current Outpatient " Medications   Medication Sig Dispense Refill   • busPIRone (BUSPAR) 10 MG tablet Take 1 tablet by mouth 3 (Three) Times a Day. 90 tablet 1   • lamoTRIgine (LaMICtal) 150 MG tablet Take 1 tablet by mouth Daily. 30 tablet 1   • levonorgestrel-ethinyl estradiol (LESSINA) 0.1-20 MG-MCG per tablet Take 1 tablet by mouth Daily.     • OLANZapine (ZyPREXA) 5 MG tablet Take 1 tablet by mouth Every Night for 30 days. 30 tablet 0   • prazosin (MINIPRESS) 2 MG capsule Take 2 capsules by mouth Every Night. 60 capsule 1     No current facility-administered medications for this visit.       Physical Exam  Nursing note reviewed.   Constitutional:       Appearance: Normal appearance.   Neurological:      Mental Status: She is alert.   Psychiatric:         Attention and Perception: Attention and perception normal.         Mood and Affect: Affect normal. Mood is anxious and depressed.         Speech: Speech normal.         Behavior: Behavior normal. Behavior is cooperative.         Thought Content: Thought content normal.         Cognition and Memory: Cognition and memory normal.        Result Review :            Patient refuses all labs.     Assessment and Plan    Problem List Items Addressed This Visit        Mental Health    Generalized anxiety disorder (Chronic)    Relevant Medications    busPIRone (BUSPAR) 10 MG tablet    lamoTRIgine (LaMICtal) 150 MG tablet    OLANZapine (ZyPREXA) 5 MG tablet      Other Visit Diagnoses     Severe episode of recurrent major depressive disorder, without psychotic features (CMS/HCC)    -  Primary    Relevant Medications    busPIRone (BUSPAR) 10 MG tablet    lamoTRIgine (LaMICtal) 150 MG tablet    OLANZapine (ZyPREXA) 5 MG tablet    Chronic post-traumatic stress disorder (PTSD)        Relevant Medications    busPIRone (BUSPAR) 10 MG tablet    prazosin (MINIPRESS) 2 MG capsule    lamoTRIgine (LaMICtal) 150 MG tablet    OLANZapine (ZyPREXA) 5 MG tablet    Borderline personality disorder (CMS/HCC)   (Chronic)       Relevant Medications    busPIRone (BUSPAR) 10 MG tablet    lamoTRIgine (LaMICtal) 150 MG tablet    OLANZapine (ZyPREXA) 5 MG tablet            TREATMENT PLAN/GOALS: Continue supportive psychotherapy efforts and medications as indicated. Treatment and medication options discussed during today's visit. Patient ackowledged and verbally consented to continue with current treatment plan and was educated on the importance of compliance with treatment and follow-up appointments.    MEDICATION ISSUES:  We discussed risks, benefits, and side effects of the above medications and the patient was agreeable with the plan. Patient was educated on the importance of compliance with treatment and follow-up appointments.  Patient is agreeable to call the office with any worsening of symptoms or onset of side effects. Patient is agreeable to call 911 or go to the nearest ER should he/she begin having SI/HI. We will continue Lamictal in an effort to stabilize mood.  The patient was reminded to immediately come to the hospital should there be any loss of control.  Explanation was given to her regarding Lamictal and the potential for Dylan Stoney syndrome and significant rash.  Patient was encouraged to check skin prior to beginning.  Patient was encouraged to report any rash and to immediately stop medication.    -Continue lamotrigine 150 mg daily for mood and BPD symptoms.  -Continue BuSpar 10 mg 3 times a day for anxiety.  -Continue Minipress 2 mg at night for nightmares.  -Since patient is reporting significant depression and issues with sleep will begin Zyprexa 5 mg at night for treatment resistant depression as patient has failed and tried risperidone, Adderall, citalopram, trazodone, sertraline, amitriptyline, aripiprazole, quetiapine, fluoxetine, and bupropion.     Counseled patient regarding multimodal approach with healthy nutrition, healthy sleep, regular physical activity, social activities, counseling, and  medications.      Coping skills reviewed and encouraged positive framing of thoughts     Assisted patient in processing above session content; acknowledged and normalized patient’s thoughts, feelings, and concerns.  Applied  positive coping skills and behavior management in session.  Allowed patient to freely discuss issues without interruption or judgment. Provided safe, confidential environment to facilitate the development of positive therapeutic relationship and encourage open, honest communication. Assisted patient in identifying risk factors which would indicate the need for higher level of care including thoughts to harm self or others and/or self-harming behavior and encouraged patient to contact this office, call 911, or present to the nearest emergency room should any of these events occur. Discussed crisis intervention services and means to access.     MEDS ORDERED DURING VISIT:  New Medications Ordered This Visit   Medications   • busPIRone (BUSPAR) 10 MG tablet     Sig: Take 1 tablet by mouth 3 (Three) Times a Day.     Dispense:  90 tablet     Refill:  1   • prazosin (MINIPRESS) 2 MG capsule     Sig: Take 2 capsules by mouth Every Night.     Dispense:  60 capsule     Refill:  1   • lamoTRIgine (LaMICtal) 150 MG tablet     Sig: Take 1 tablet by mouth Daily.     Dispense:  30 tablet     Refill:  1   • OLANZapine (ZyPREXA) 5 MG tablet     Sig: Take 1 tablet by mouth Every Night for 30 days.     Dispense:  30 tablet     Refill:  0           Follow Up   Return in about 4 weeks (around 6/2/2021), or if symptoms worsen or fail to improve, for Recheck.    Patient was given instructions and counseling regarding her condition or for health maintenance advice. Please see specific information pulled into the AVS if appropriate.     This document has been electronically signed by LESLIE Martin  May 5, 2021 13:26 EDT    Part of this note may be an electronic transcription/translation of spoken language to  printed text using the Dragon Dictation System.

## 2021-06-02 ENCOUNTER — TELEMEDICINE (OUTPATIENT)
Dept: PSYCHIATRY | Facility: CLINIC | Age: 21
End: 2021-06-02

## 2021-06-02 DIAGNOSIS — G47.9 SLEEPING DIFFICULTY: ICD-10-CM

## 2021-06-02 DIAGNOSIS — F41.1 GENERALIZED ANXIETY DISORDER: ICD-10-CM

## 2021-06-02 DIAGNOSIS — F43.12 CHRONIC POST-TRAUMATIC STRESS DISORDER (PTSD): ICD-10-CM

## 2021-06-02 DIAGNOSIS — F60.3 BORDERLINE PERSONALITY DISORDER (HCC): ICD-10-CM

## 2021-06-02 DIAGNOSIS — F33.2 SEVERE EPISODE OF RECURRENT MAJOR DEPRESSIVE DISORDER, WITHOUT PSYCHOTIC FEATURES (HCC): Primary | ICD-10-CM

## 2021-06-02 PROCEDURE — 99214 OFFICE O/P EST MOD 30 MIN: CPT | Performed by: NURSE PRACTITIONER

## 2021-06-02 PROCEDURE — 90833 PSYTX W PT W E/M 30 MIN: CPT | Performed by: NURSE PRACTITIONER

## 2021-06-02 NOTE — PROGRESS NOTES
This provider is located at [facility], [address] [facility] [place] [address].The Patient is seen remotely at [address] [place], [place] [address] [address] via [employer]. Patient is being seen via telehealth and confirm that they are in a secure environment for this session. The patient's condition being diagnosed/treated is appropriate for telemedicine. The provider identified himself/herself: herself as well as her credentials.   The patient gave consent to be seen remotely, and when consent is given they understand that the consent allows for patient identifiable information to be sent to a third party as needed.   They may refuse to be seen remotely at any time. The electronic data is encrypted and password protected, and the patient has been advised of the potential risks to privacy not withstanding such measures.    You have chosen to receive care through a telehealth visit.  Do you consent to use a video/audio connection for your medical care today? Yes        Chief Complaint  Follow-up for mood     Subjective    Samantha Dubose presents to [facility] for medication management.     History of Present Illness   Patient presents today via MyChart noting that she took 2 doses of the Zyprexa and I made her nausea and dizzy so she stopped taking them.  Patient states she was not a break them in half but did not have a pill cutter.  Highly encouraged patient that she needs to get a pill cutter and break in half as she may have to deal with the side effects for at least 1 week and then should go away.  Patient states however that the medication was helpful for her sleep.  Patient states that she has had personal stuff that she does not feel it is appropriate to talk about as it has to do with her mother and father but it is affecting her.  Patient states that is difficult to be around so she has been detaching herself more not sleeping as much decreased  appetite as well as depressed mood occasional hopelessness and helplessness.  Patient however denies any self-harm or any SI as she states she is currently on the eighth month vicente with no self-harm.  Patient denies throwing up as she states she is eating just not as much.  She notes that she is down to 118 pounds which she reports she lost slowly over the last couple of months.  Patient denies any rash or side effects to the medications.  Patient denies any SI/HI/AH/VH.      Objective   Vital Signs:   There were no vitals taken for this visit.  Due to the remote nature of this encounter (virtual encounter), vitals were unable to be obtained.  Height stated at 65 inches.  Weight stated at 133 pounds.      Patient screened positive for depression based on a PHQ-9 score of 14 on 5/5/2021. Follow-up recommendations include: see notes.    PHQ-9 Score:   PHQ-9 Total Score:       Mental Status Exam:   Hygiene:   good  Cooperation:  Cooperative  Eye Contact:  Good  Psychomotor Behavior:  Appropriate  Affect:  Appropriate  Mood: normal  Speech:  Normal  Thought Process:  Goal directed and Linear  Thought Content:  Mood incongruent  Suicidal:  None   Homicidal:  None  Hallucinations:  None  Delusion:  None  Memory:  Intact  Orientation:  Person, Place, Time and Situation  Reliability:  good  Insight:  Fair  Judgement:  Fair  Impulse Control:  Fair  Physical/Medical Issues:  No      Current Medications:   Current Outpatient Medications   Medication Sig Dispense Refill   • busPIRone (BUSPAR) 10 MG tablet Take 1 tablet by mouth 3 (Three) Times a Day. 90 tablet 1   • lamoTRIgine (LaMICtal) 150 MG tablet Take 1 tablet by mouth Daily. 30 tablet 1   • levonorgestrel-ethinyl estradiol (LESSINA) 0.1-20 MG-MCG per tablet Take 1 tablet by mouth Daily.     • OLANZapine (ZyPREXA) 5 MG tablet Take 1 tablet by mouth Every Night for 30 days. 30 tablet 0   • prazosin (MINIPRESS) 2 MG capsule Take 2 capsules by mouth Every Night. 60 capsule 1      No current facility-administered medications for this visit.       Physical Exam  Nursing note reviewed.   Constitutional:       Appearance: Normal appearance.   Neurological:      Mental Status: She is alert.   Psychiatric:         Attention and Perception: Attention and perception normal.         Mood and Affect: Affect normal. Mood is anxious and depressed.         Speech: Speech normal.         Behavior: Behavior normal. Behavior is cooperative.         Thought Content: Thought content normal.         Cognition and Memory: Cognition and memory normal.        Result Review :            Patient refuses all labs.     Assessment and Plan    Problem List Items Addressed This Visit        Mental Health    Generalized anxiety disorder (Chronic)      Other Visit Diagnoses     Severe episode of recurrent major depressive disorder, without psychotic features (CMS/HCC)    -  Primary    Borderline personality disorder (CMS/HCC)        Sleeping difficulty        Chronic post-traumatic stress disorder (PTSD)                TREATMENT PLAN/GOALS: Continue supportive psychotherapy efforts and medications as indicated. Treatment and medication options discussed during today's visit. Patient ackowledged and verbally consented to continue with current treatment plan and was educated on the importance of compliance with treatment and follow-up appointments.    MEDICATION ISSUES:  We discussed risks, benefits, and side effects of the above medications and the patient was agreeable with the plan. Patient was educated on the importance of compliance with treatment and follow-up appointments.  Patient is agreeable to call the office with any worsening of symptoms or onset of side effects. Patient is agreeable to call 911 or go to the nearest ER should he/she begin having SI/HI. We will continue Lamictal in an effort to stabilize mood.  The patient was reminded to immediately come to the hospital should there be any loss of control.   Explanation was given to her regarding Lamictal and the potential for Dylan Stoney syndrome and significant rash.  Patient was encouraged to check skin prior to beginning.  Patient was encouraged to report any rash and to immediately stop medication.    -Continue lamotrigine 150 mg daily for mood and BPD symptoms.  -Continue BuSpar 10 mg 3 times a day for anxiety.  -Continue Minipress 2 mg at night for nightmares.  -Highly encourage patient that she needs to break the Zyprexa in half taking 2.5 at least for a week and then the side effects should discontinue as it was helpful for her mood and her sleep when she was taking.  Patient has failed and tried risperidone, Adderall, citalopram, trazodone, sertraline, amitriptyline, aripiprazole, quetiapine, fluoxetine, and bupropion.      1:10-1:27 17 minutes spent on SUPPORTIVE PSYCHOTHERAPY: continuing efforts to promote the therapeutic alliance, address the patient’s issues, and strengthen self awareness, insights, and coping skills.  Also educated patient extensively regarding BPD and how she is an adult and she does not need to let her parents relationship affect her mood.  Encourage patient that she needs to find her own happiness instead of sabotaging her happiness when others are having difficulty as that is negative seeking attention.  Patient verbalized understanding and stated she would work on this.       Counseled patient regarding multimodal approach with healthy nutrition, healthy sleep, regular physical activity, social activities, counseling, and medications.      Coping skills reviewed and encouraged positive framing of thoughts     Assisted patient in processing above session content; acknowledged and normalized patient’s thoughts, feelings, and concerns.  Applied  positive coping skills and behavior management in session.  Allowed patient to freely discuss issues without interruption or judgment. Provided safe, confidential environment to facilitate  the development of positive therapeutic relationship and encourage open, honest communication. Assisted patient in identifying risk factors which would indicate the need for higher level of care including thoughts to harm self or others and/or self-harming behavior and encouraged patient to contact this office, call 911, or present to the nearest emergency room should any of these events occur. Discussed crisis intervention services and means to access.     MEDS ORDERED DURING VISIT:  No orders of the defined types were placed in this encounter.          Follow Up   Return in about 4 weeks (around 6/30/2021), or if symptoms worsen or fail to improve, for Recheck.    Patient was given instructions and counseling regarding her condition or for health maintenance advice. Please see specific information pulled into the AVS if appropriate.     This document has been electronically signed by LESLIE Martin  June 2, 2021 13:52 EDT    Part of this note may be an electronic transcription/translation of spoken language to printed text using the Dragon Dictation System.

## 2021-06-25 DIAGNOSIS — F43.12 CHRONIC POST-TRAUMATIC STRESS DISORDER (PTSD): ICD-10-CM

## 2021-06-25 DIAGNOSIS — F60.3 BORDERLINE PERSONALITY DISORDER (HCC): Chronic | ICD-10-CM

## 2021-06-25 DIAGNOSIS — F33.2 SEVERE EPISODE OF RECURRENT MAJOR DEPRESSIVE DISORDER, WITHOUT PSYCHOTIC FEATURES (HCC): ICD-10-CM

## 2021-06-25 DIAGNOSIS — F41.1 GENERALIZED ANXIETY DISORDER: Chronic | ICD-10-CM

## 2021-06-25 RX ORDER — LAMOTRIGINE 150 MG/1
150 TABLET ORAL DAILY
Qty: 30 TABLET | Refills: 1 | Status: SHIPPED | OUTPATIENT
Start: 2021-06-25 | End: 2021-08-18 | Stop reason: SDUPTHER

## 2021-07-01 ENCOUNTER — TELEMEDICINE (OUTPATIENT)
Dept: PSYCHIATRY | Facility: CLINIC | Age: 21
End: 2021-07-01

## 2021-07-01 DIAGNOSIS — F41.1 GENERALIZED ANXIETY DISORDER: Chronic | ICD-10-CM

## 2021-07-01 DIAGNOSIS — F60.3 BORDERLINE PERSONALITY DISORDER (HCC): Primary | Chronic | ICD-10-CM

## 2021-07-01 DIAGNOSIS — F33.2 SEVERE EPISODE OF RECURRENT MAJOR DEPRESSIVE DISORDER, WITHOUT PSYCHOTIC FEATURES (HCC): ICD-10-CM

## 2021-07-01 PROCEDURE — 99214 OFFICE O/P EST MOD 30 MIN: CPT | Performed by: NURSE PRACTITIONER

## 2021-07-01 RX ORDER — OLANZAPINE 5 MG/1
5 TABLET ORAL NIGHTLY
Qty: 30 TABLET | Refills: 0 | Status: SHIPPED | OUTPATIENT
Start: 2021-07-01 | End: 2021-08-26 | Stop reason: SDUPTHER

## 2021-07-01 NOTE — PROGRESS NOTES
"This provider is located at [facility], [address] [place] [address].The Patient is seen remotely at [address] [place], [place] [address] [address] via [email or web address]. Patient is being seen via telehealth and confirm that they are in a secure environment for this session. The patient's condition being diagnosed/treated is appropriate for telemedicine. The provider identified himself/herself: herself as well as her credentials.   The patient gave consent to be seen remotely, and when consent is given they understand that the consent allows for patient identifiable information to be sent to a third party as needed.   They may refuse to be seen remotely at any time. The electronic data is encrypted and password protected, and the patient has been advised of the potential risks to privacy not withstanding such measures.    You have chosen to receive care through a telehealth visit.  Do you consent to use a video/audio connection for your medical care today? Yes        Chief Complaint  Follow-up for mood     Subjective    Samantha Dubose presents to [facility] for medication management.     History of Present Illness   Patient presents today reporting \"I do not know how I am feeling\".  Patient states that things have been difficult at home as her father sleeping on the couch and her brother is been \"a asshole\".  She states she is the only one helping her mom out.  Patient states that she believes she is ready to get her own apartment and be out on her own.  Patient states that she was deemed unfit to work so she states if she gets a job but has to be part-time.  Encourage the patient to look for part-time jobs or as needed work to get her out of the home and save money.  Patient states that is been several months since she has been to therapy but sees him in July which she is looking forward to.  Patient reports that her sleep has been fine but her appetite " has been decreased as she has not felt hungry.  Patient reports some depression and feeling hopeless and helpless but relates it to being in the home as she feels it is time to be out on her own.  Patient denies any side effects to the medications or rash.  Denies any SI.  Denies any self-harm.  Denies any HI/AH/VH.      Objective   Vital Signs:   There were no vitals taken for this visit.  Due to the remote nature of this encounter (virtual encounter), vitals were unable to be obtained.  Height stated at 65 inches.  Weight stated at 115 pounds.      Patient screened positive for depression based on a PHQ-9 score of 14 on 5/5/2021. Follow-up recommendations include: see notes.    PHQ-9 Score:   PHQ-9 Total Score:       Mental Status Exam:   Hygiene:   good  Cooperation:  Cooperative  Eye Contact:  Good  Psychomotor Behavior:  Appropriate  Affect:  Appropriate  Mood: normal and sad  Speech:  Normal  Thought Process:  Goal directed and Linear  Thought Content:  Mood incongruent  Suicidal:  None   Homicidal:  None  Hallucinations:  None  Delusion:  None  Memory:  Intact  Orientation:  Person, Place, Time and Situation  Reliability:  good  Insight:  Fair  Judgement:  Fair  Impulse Control:  Fair  Physical/Medical Issues:  No      Current Medications:   Current Outpatient Medications   Medication Sig Dispense Refill   • busPIRone (BUSPAR) 10 MG tablet Take 1 tablet by mouth 3 (Three) Times a Day. 90 tablet 1   • lamoTRIgine (LaMICtal) 150 MG tablet TAKE 1 TABLET BY MOUTH DAILY. 30 tablet 1   • levonorgestrel-ethinyl estradiol (LESSINA) 0.1-20 MG-MCG per tablet Take 1 tablet by mouth Daily.     • OLANZapine (ZyPREXA) 5 MG tablet Take 1 tablet by mouth Every Night for 30 days. 30 tablet 0   • prazosin (MINIPRESS) 2 MG capsule Take 2 capsules by mouth Every Night. 60 capsule 1     No current facility-administered medications for this visit.       Physical Exam  Nursing note reviewed.   Constitutional:       Appearance:  Normal appearance.   Neurological:      Mental Status: She is alert.   Psychiatric:         Attention and Perception: Attention and perception normal.         Mood and Affect: Affect normal. Mood is depressed. Mood is not anxious.         Speech: Speech normal.         Behavior: Behavior normal. Behavior is cooperative.         Thought Content: Thought content normal.         Cognition and Memory: Cognition and memory normal.        Result Review :            Patient refuses all labs.     Assessment and Plan    Problem List Items Addressed This Visit        Mental Health    Generalized anxiety disorder (Chronic)    Relevant Medications    OLANZapine (ZyPREXA) 5 MG tablet      Other Visit Diagnoses     Borderline personality disorder (CMS/HCC)  (Chronic)   -  Primary    Relevant Medications    OLANZapine (ZyPREXA) 5 MG tablet    Severe episode of recurrent major depressive disorder, without psychotic features (CMS/HCC)        Relevant Medications    OLANZapine (ZyPREXA) 5 MG tablet            TREATMENT PLAN/GOALS: Continue supportive psychotherapy efforts and medications as indicated. Treatment and medication options discussed during today's visit. Patient ackowledged and verbally consented to continue with current treatment plan and was educated on the importance of compliance with treatment and follow-up appointments.    MEDICATION ISSUES:  We discussed risks, benefits, and side effects of the above medications and the patient was agreeable with the plan. Patient was educated on the importance of compliance with treatment and follow-up appointments.  Patient is agreeable to call the office with any worsening of symptoms or onset of side effects. Patient is agreeable to call 911 or go to the nearest ER should he/she begin having SI/HI. We will continue Lamictal in an effort to stabilize mood.  The patient was reminded to immediately come to the hospital should there be any loss of control.  Explanation was given to  her regarding Lamictal and the potential for Dylan Stoney syndrome and significant rash.  Patient was encouraged to check skin prior to beginning.  Patient was encouraged to report any rash and to immediately stop medication.    -Continue lamotrigine 150 mg daily for mood and BPD symptoms.  -Continue BuSpar 10 mg 3 times a day for anxiety.  -Continue Minipress 2 mg at night for nightmares.  -Highly encourage patient that she needs to break the Zyprexa in half taking 2.5 at least for a week and then the side effects should discontinue as it was helpful for her mood and her sleep when she was taking.  Patient has failed and tried risperidone, Adderall, citalopram, trazodone, sertraline, amitriptyline, aripiprazole, quetiapine, fluoxetine, and bupropion.  -Encourage patient to keep her therapy appointment to help with her coping skills.  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  Patient wanted to follow-up in 8 weeks as she stated she was okay with the medication at this time encouraged her if anything worsen to contact the clinic for sooner appointment she verbalized understanding.      Counseled patient regarding multimodal approach with healthy nutrition, healthy sleep, regular physical activity, social activities, counseling, and medications.      Coping skills reviewed and encouraged positive framing of thoughts     Assisted patient in processing above session content; acknowledged and normalized patient’s thoughts, feelings, and concerns.  Applied  positive coping skills and behavior management in session.  Allowed patient to freely discuss issues without interruption or judgment. Provided safe, confidential environment to facilitate the development of  positive therapeutic relationship and encourage open, honest communication. Assisted patient in identifying risk factors which would indicate the need for higher level of care including thoughts to harm self or others and/or self-harming behavior and encouraged patient to contact this office, call 911, or present to the nearest emergency room should any of these events occur. Discussed crisis intervention services and means to access.     MEDS ORDERED DURING VISIT:  New Medications Ordered This Visit   Medications   • OLANZapine (ZyPREXA) 5 MG tablet     Sig: Take 1 tablet by mouth Every Night for 30 days.     Dispense:  30 tablet     Refill:  0           Follow Up   Return in about 8 weeks (around 8/26/2021), or if symptoms worsen or fail to improve, for Recheck.    Patient was given instructions and counseling regarding her condition or for health maintenance advice. Please see specific information pulled into the AVS if appropriate.     Some of the data in this electronic note has been brought forward from a previous encounter, any necessary changes have been made, it has been reviewed by this APRN, and it is accurate.      This document has been electronically signed by LESLIE Martin  July 1, 2021 13:51 EDT    Part of this note may be an electronic transcription/translation of spoken language to printed text using the Dragon Dictation System.

## 2021-07-22 ENCOUNTER — HOSPITAL ENCOUNTER (EMERGENCY)
Facility: HOSPITAL | Age: 21
Discharge: HOME | End: 2021-07-22
Attending: FAMILY MEDICINE | Admitting: EMERGENCY MEDICINE

## 2021-07-22 ENCOUNTER — APPOINTMENT (OUTPATIENT)
Dept: GENERAL RADIOLOGY | Facility: HOSPITAL | Age: 21
End: 2021-07-22

## 2021-07-22 ENCOUNTER — APPOINTMENT (OUTPATIENT)
Dept: CT IMAGING | Facility: HOSPITAL | Age: 21
End: 2021-07-22

## 2021-07-22 ENCOUNTER — TELEPHONE (OUTPATIENT)
Dept: PSYCHIATRY | Facility: CLINIC | Age: 21
End: 2021-07-22

## 2021-07-22 VITALS
SYSTOLIC BLOOD PRESSURE: 122 MMHG | HEART RATE: 81 BPM | WEIGHT: 118 LBS | TEMPERATURE: 97.8 F | BODY MASS INDEX: 19.66 KG/M2 | RESPIRATION RATE: 16 BRPM | HEIGHT: 65 IN | DIASTOLIC BLOOD PRESSURE: 68 MMHG | OXYGEN SATURATION: 100 %

## 2021-07-22 DIAGNOSIS — R56.9 SEIZURE (HCC): Primary | ICD-10-CM

## 2021-07-22 LAB
6-ACETYL MORPHINE: NEGATIVE
ALBUMIN SERPL-MCNC: 4.52 G/DL (ref 3.5–5.2)
ALBUMIN/GLOB SERPL: 1.4 G/DL
ALP SERPL-CCNC: 42 U/L (ref 39–117)
ALT SERPL W P-5'-P-CCNC: 10 U/L (ref 1–33)
AMPHET+METHAMPHET UR QL: NEGATIVE
ANION GAP SERPL CALCULATED.3IONS-SCNC: 12.6 MMOL/L (ref 5–15)
AST SERPL-CCNC: 11 U/L (ref 1–32)
B-HCG UR QL: NEGATIVE
BARBITURATES UR QL SCN: NEGATIVE
BASOPHILS # BLD AUTO: 0.07 10*3/MM3 (ref 0–0.2)
BASOPHILS NFR BLD AUTO: 1 % (ref 0–1.5)
BENZODIAZ UR QL SCN: NEGATIVE
BILIRUB SERPL-MCNC: 0.4 MG/DL (ref 0–1.2)
BILIRUB UR QL STRIP: NEGATIVE
BUN SERPL-MCNC: 11 MG/DL (ref 6–20)
BUN/CREAT SERPL: 13.4 (ref 7–25)
BUPRENORPHINE SERPL-MCNC: NEGATIVE NG/ML
CALCIUM SPEC-SCNC: 9.3 MG/DL (ref 8.6–10.5)
CANNABINOIDS SERPL QL: NEGATIVE
CHLORIDE SERPL-SCNC: 107 MMOL/L (ref 98–107)
CLARITY UR: CLEAR
CO2 SERPL-SCNC: 19.4 MMOL/L (ref 22–29)
COCAINE UR QL: NEGATIVE
COLOR UR: YELLOW
CREAT SERPL-MCNC: 0.82 MG/DL (ref 0.57–1)
CRP SERPL-MCNC: <0.3 MG/DL (ref 0–0.5)
D-LACTATE SERPL-SCNC: 0.7 MMOL/L (ref 0.5–2)
DEPRECATED RDW RBC AUTO: 47.8 FL (ref 37–54)
EOSINOPHIL # BLD AUTO: 0.1 10*3/MM3 (ref 0–0.4)
EOSINOPHIL NFR BLD AUTO: 1.4 % (ref 0.3–6.2)
ERYTHROCYTE [DISTWIDTH] IN BLOOD BY AUTOMATED COUNT: 14 % (ref 12.3–15.4)
GFR SERPL CREATININE-BSD FRML MDRD: 88 ML/MIN/1.73
GLOBULIN UR ELPH-MCNC: 3.3 GM/DL
GLUCOSE SERPL-MCNC: 92 MG/DL (ref 65–99)
GLUCOSE UR STRIP-MCNC: NEGATIVE MG/DL
HCT VFR BLD AUTO: 40.3 % (ref 34–46.6)
HGB BLD-MCNC: 13.1 G/DL (ref 12–15.9)
HGB UR QL STRIP.AUTO: NEGATIVE
HOLD SPECIMEN: NORMAL
HOLD SPECIMEN: NORMAL
IMM GRANULOCYTES # BLD AUTO: 0.01 10*3/MM3 (ref 0–0.05)
IMM GRANULOCYTES NFR BLD AUTO: 0.1 % (ref 0–0.5)
KETONES UR QL STRIP: ABNORMAL
LEUKOCYTE ESTERASE UR QL STRIP.AUTO: NEGATIVE
LIPASE SERPL-CCNC: 29 U/L (ref 13–60)
LYMPHOCYTES # BLD AUTO: 2.11 10*3/MM3 (ref 0.7–3.1)
LYMPHOCYTES NFR BLD AUTO: 29.7 % (ref 19.6–45.3)
MCH RBC QN AUTO: 29.8 PG (ref 26.6–33)
MCHC RBC AUTO-ENTMCNC: 32.5 G/DL (ref 31.5–35.7)
MCV RBC AUTO: 91.8 FL (ref 79–97)
METHADONE UR QL SCN: NEGATIVE
MONOCYTES # BLD AUTO: 0.39 10*3/MM3 (ref 0.1–0.9)
MONOCYTES NFR BLD AUTO: 5.5 % (ref 5–12)
NEUTROPHILS NFR BLD AUTO: 4.42 10*3/MM3 (ref 1.7–7)
NEUTROPHILS NFR BLD AUTO: 62.3 % (ref 42.7–76)
NITRITE UR QL STRIP: NEGATIVE
NRBC BLD AUTO-RTO: 0 /100 WBC (ref 0–0.2)
OPIATES UR QL: NEGATIVE
OXYCODONE UR QL SCN: NEGATIVE
PCP UR QL SCN: NEGATIVE
PH UR STRIP.AUTO: >=9 [PH] (ref 5–8)
PLATELET # BLD AUTO: 263 10*3/MM3 (ref 140–450)
PMV BLD AUTO: 9.7 FL (ref 6–12)
POTASSIUM SERPL-SCNC: 4.4 MMOL/L (ref 3.5–5.2)
PROT SERPL-MCNC: 7.8 G/DL (ref 6–8.5)
PROT UR QL STRIP: NEGATIVE
RBC # BLD AUTO: 4.39 10*6/MM3 (ref 3.77–5.28)
SODIUM SERPL-SCNC: 139 MMOL/L (ref 136–145)
SP GR UR STRIP: 1.03 (ref 1–1.03)
UROBILINOGEN UR QL STRIP: ABNORMAL
WBC # BLD AUTO: 7.1 10*3/MM3 (ref 3.4–10.8)
WHOLE BLOOD HOLD SPECIMEN: NORMAL

## 2021-07-22 PROCEDURE — 86140 C-REACTIVE PROTEIN: CPT | Performed by: PHYSICIAN ASSISTANT

## 2021-07-22 PROCEDURE — 80307 DRUG TEST PRSMV CHEM ANLYZR: CPT | Performed by: PHYSICIAN ASSISTANT

## 2021-07-22 PROCEDURE — 83690 ASSAY OF LIPASE: CPT | Performed by: PHYSICIAN ASSISTANT

## 2021-07-22 PROCEDURE — 70450 CT HEAD/BRAIN W/O DYE: CPT

## 2021-07-22 PROCEDURE — 70450 CT HEAD/BRAIN W/O DYE: CPT | Performed by: RADIOLOGY

## 2021-07-22 PROCEDURE — 80053 COMPREHEN METABOLIC PANEL: CPT | Performed by: PHYSICIAN ASSISTANT

## 2021-07-22 PROCEDURE — 71045 X-RAY EXAM CHEST 1 VIEW: CPT | Performed by: RADIOLOGY

## 2021-07-22 PROCEDURE — 81025 URINE PREGNANCY TEST: CPT | Performed by: PHYSICIAN ASSISTANT

## 2021-07-22 PROCEDURE — 81003 URINALYSIS AUTO W/O SCOPE: CPT | Performed by: PHYSICIAN ASSISTANT

## 2021-07-22 PROCEDURE — 85025 COMPLETE CBC W/AUTO DIFF WBC: CPT | Performed by: PHYSICIAN ASSISTANT

## 2021-07-22 PROCEDURE — 83605 ASSAY OF LACTIC ACID: CPT | Performed by: PHYSICIAN ASSISTANT

## 2021-07-22 PROCEDURE — 36415 COLL VENOUS BLD VENIPUNCTURE: CPT

## 2021-07-22 PROCEDURE — 71045 X-RAY EXAM CHEST 1 VIEW: CPT

## 2021-07-22 PROCEDURE — 99283 EMERGENCY DEPT VISIT LOW MDM: CPT

## 2021-07-22 NOTE — TELEPHONE ENCOUNTER
No her medications have not been changed. Plus Lamictal is used to prevent seizures. Tell them to please keep me updated.

## 2021-07-22 NOTE — TELEPHONE ENCOUNTER
Patients mother called said they are leaving [place] now (1:00pm 7/22/21) to bring patient to the emergency room in [place] she believes patient had a seizure this morning (she states patient has not had a seizure in a long time) She wanted to make you aware and  she wonders if it could have been brought on by one of her medications? Please advise?    Thank you

## 2021-07-22 NOTE — TELEPHONE ENCOUNTER
Called patient mother back advised her of Lamictal being used to prevent seizures and asked her to give us a call to update on patients medical status.
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
## 2021-08-13 DIAGNOSIS — F43.12 CHRONIC POST-TRAUMATIC STRESS DISORDER (PTSD): ICD-10-CM

## 2021-08-13 DIAGNOSIS — F41.1 GENERALIZED ANXIETY DISORDER: Chronic | ICD-10-CM

## 2021-08-13 RX ORDER — BUSPIRONE HYDROCHLORIDE 10 MG/1
TABLET ORAL
Qty: 90 TABLET | Refills: 0 | Status: SHIPPED | OUTPATIENT
Start: 2021-08-13 | End: 2021-10-04 | Stop reason: SDUPTHER

## 2021-08-16 RX ORDER — PRAZOSIN HYDROCHLORIDE 2 MG/1
CAPSULE ORAL
Qty: 30 CAPSULE | Refills: 1 | Status: SHIPPED | OUTPATIENT
Start: 2021-08-16 | End: 2021-10-04 | Stop reason: SDUPTHER

## 2021-08-18 DIAGNOSIS — F41.1 GENERALIZED ANXIETY DISORDER: Chronic | ICD-10-CM

## 2021-08-18 DIAGNOSIS — F60.3 BORDERLINE PERSONALITY DISORDER (HCC): Chronic | ICD-10-CM

## 2021-08-18 DIAGNOSIS — F43.12 CHRONIC POST-TRAUMATIC STRESS DISORDER (PTSD): ICD-10-CM

## 2021-08-18 DIAGNOSIS — F33.2 SEVERE EPISODE OF RECURRENT MAJOR DEPRESSIVE DISORDER, WITHOUT PSYCHOTIC FEATURES (HCC): ICD-10-CM

## 2021-08-18 RX ORDER — LAMOTRIGINE 150 MG/1
150 TABLET ORAL DAILY
Qty: 30 TABLET | Refills: 1 | Status: SHIPPED | OUTPATIENT
Start: 2021-08-18 | End: 2021-10-04 | Stop reason: SDUPTHER

## 2021-08-18 NOTE — TELEPHONE ENCOUNTER
Patient mother left voicemail on [facility], picked up patients refills and LaMictal was not included. Please refill.

## 2021-08-26 ENCOUNTER — TELEMEDICINE (OUTPATIENT)
Dept: PSYCHIATRY | Facility: CLINIC | Age: 21
End: 2021-08-26

## 2021-08-26 DIAGNOSIS — F33.2 SEVERE EPISODE OF RECURRENT MAJOR DEPRESSIVE DISORDER, WITHOUT PSYCHOTIC FEATURES (HCC): ICD-10-CM

## 2021-08-26 DIAGNOSIS — F60.3 BORDERLINE PERSONALITY DISORDER (HCC): Chronic | ICD-10-CM

## 2021-08-26 DIAGNOSIS — G47.9 SLEEPING DIFFICULTY: ICD-10-CM

## 2021-08-26 DIAGNOSIS — F41.1 GENERALIZED ANXIETY DISORDER: Primary | Chronic | ICD-10-CM

## 2021-08-26 PROCEDURE — 99214 OFFICE O/P EST MOD 30 MIN: CPT | Performed by: NURSE PRACTITIONER

## 2021-08-26 RX ORDER — OLANZAPINE 5 MG/1
5 TABLET ORAL NIGHTLY
Qty: 90 TABLET | Refills: 0 | Status: SHIPPED | OUTPATIENT
Start: 2021-08-26 | End: 2021-10-04

## 2021-08-26 NOTE — PROGRESS NOTES
This provider is located at [facility], [address] [facility] [place] [address].The Patient is seen remotely at [address] [place], [place] [address] [address] via [employer]. Patient is being seen via telehealth and confirm that they are in a secure environment for this session. The patient's condition being diagnosed/treated is appropriate for telemedicine. The provider identified himself/herself: herself as well as her credentials.   The patient gave consent to be seen remotely, and when consent is given they understand that the consent allows for patient identifiable information to be sent to a third party as needed.   They may refuse to be seen remotely at any time. The electronic data is encrypted and password protected, and the patient has been advised of the potential risks to privacy not withstanding such measures.    You have chosen to receive care through a telehealth visit.  Do you consent to use a video/audio connection for your medical care today? Yes    Chief Complaint  Follow-up for mood/anxiety    Subjective    Samantha Dubose presents to [facility] for medication management.     History of Present Illness   Patient presents today reporting that things have been difficult.  She notes that she had the seizure on July 22 and they are sending her to a neurologist but she does not get in until September 30.  She states they told her she may be having them in her sleep which she states that has scared her so she does not want to sleep now.  She notes however that may be the cause of her sleep difficulties.  Patient states that her depression and anxiety are about the same.  She notes increased worry due to fear of unknown with the seizures.  Patient states that that may explain why she has been forgetful.  Patient notes significant depression and worthlessness and increased thoughts to self-harm.  Encourage patient that if she self-harm and had a  seizure something severe could happen and she states she never thought of it that way.  Patient notes that she has not been taking the Zyprexa highly encouraged her to take it not only for her depression but her for her sleep as well as lack of sleep can induce seizures patient verbalized understanding.  Patient denied any side effects to the current medications.  Patient states appetite has been down due to anxiety.  Denies any SI/HI/AH/VH      Objective   Vital Signs:   There were no vitals taken for this visit.  Due to the remote nature of this encounter (virtual encounter), vitals were unable to be obtained.  Height stated at 65 inches.  Weight stated at 115 pounds.      Patient screened positive for depression based on a PHQ-9 score of 14 on 5/5/2021. Follow-up recommendations include: see notes.    PHQ-9 Score:   PHQ-9 Total Score:       Mental Status Exam:   Hygiene:   good  Cooperation:  Cooperative  Eye Contact:  Good  Psychomotor Behavior:  Appropriate  Affect:  Appropriate  Mood: normal and sad  Speech:  Normal  Thought Process:  Goal directed and Linear  Thought Content:  Mood incongruent  Suicidal:  None   Homicidal:  None  Hallucinations:  None  Delusion:  None  Memory:  Intact  Orientation:  Person, Place, Time and Situation  Reliability:  good  Insight:  Fair  Judgement:  Fair  Impulse Control:  Fair  Physical/Medical Issues:  No      Current Medications:   Current Outpatient Medications   Medication Sig Dispense Refill   • busPIRone (BUSPAR) 10 MG tablet TAKE 1 TABLET 3 TIMES DAILY 90 tablet 0   • lamoTRIgine (LaMICtal) 150 MG tablet Take 1 tablet by mouth Daily. 30 tablet 1   • levonorgestrel-ethinyl estradiol (LESSINA) 0.1-20 MG-MCG per tablet Take 1 tablet by mouth Daily.     • OLANZapine (ZyPREXA) 5 MG tablet Take 1 tablet by mouth Every Night for 90 days. 90 tablet 0   • prazosin (MINIPRESS) 2 MG capsule TAKE 1 CAPSULE BY MOUTH EVERY NIGHT. 30 capsule 1     No current facility-administered  medications for this visit.       Physical Exam  Nursing note reviewed.   Constitutional:       Appearance: Normal appearance.   Neurological:      Mental Status: She is alert.   Psychiatric:         Attention and Perception: Attention and perception normal.         Mood and Affect: Affect normal. Mood is anxious and depressed.         Speech: Speech normal.         Behavior: Behavior normal. Behavior is cooperative.         Thought Content: Thought content normal.         Cognition and Memory: Cognition and memory normal.        Result Review :            Patient refuses all labs.     Assessment and Plan    Problem List Items Addressed This Visit        Mental Health    Generalized anxiety disorder - Primary (Chronic)    Relevant Medications    OLANZapine (ZyPREXA) 5 MG tablet      Other Visit Diagnoses     Severe episode of recurrent major depressive disorder, without psychotic features (CMS/HCC)        Relevant Medications    OLANZapine (ZyPREXA) 5 MG tablet    Borderline personality disorder (CMS/HCC)  (Chronic)       Relevant Medications    OLANZapine (ZyPREXA) 5 MG tablet    Sleeping difficulty                TREATMENT PLAN/GOALS: Continue supportive psychotherapy efforts and medications as indicated. Treatment and medication options discussed during today's visit. Patient ackowledged and verbally consented to continue with current treatment plan and was educated on the importance of compliance with treatment and follow-up appointments.    MEDICATION ISSUES:  We discussed risks, benefits, and side effects of the above medications and the patient was agreeable with the plan. Patient was educated on the importance of compliance with treatment and follow-up appointments.  Patient is agreeable to call the office with any worsening of symptoms or onset of side effects. Patient is agreeable to call 911 or go to the nearest ER should he/she begin having SI/HI. We will continue Lamictal in an effort to stabilize mood.   The patient was reminded to immediately come to the hospital should there be any loss of control.  Explanation was given to her regarding Lamictal and the potential for Dylan Stoney syndrome and significant rash.  Patient was encouraged to check skin prior to beginning.  Patient was encouraged to report any rash and to immediately stop medication.    -Continue lamotrigine 150 mg daily for mood and BPD symptoms.  -Continue BuSpar 10 mg 3 times a day for anxiety.  -Continue Minipress 2 mg at night for nightmares.  -Restart Zyprexa at 2.5 mg but can take up to 5 mg for her depression as well as her sleep, highly encourage patient to take as she is failed numerous medications before and notes depression as well as lack of sleep recently..  Patient has failed and tried risperidone, Adderall, citalopram, trazodone, sertraline, amitriptyline, aripiprazole, quetiapine, fluoxetine, and bupropion.    -Encourage patient to keep her therapy appointment to help with her coping skills.    -Patient wanted to follow-up after her neurology appointment on September 30 in order to update me about any changes to medications or any test.      Counseled patient regarding multimodal approach with healthy nutrition, healthy sleep, regular physical activity, social activities, counseling, and medications.      Coping skills reviewed and encouraged positive framing of thoughts     Assisted patient in processing above session content; acknowledged and normalized patient’s thoughts, feelings, and concerns.  Applied  positive coping skills and behavior management in session.  Allowed patient to freely discuss issues without interruption or judgment. Provided safe, confidential environment to facilitate the development of positive therapeutic relationship and encourage open, honest communication. Assisted patient in identifying risk factors which would indicate the need for higher level of care including thoughts to harm self or others and/or  self-harming behavior and encouraged patient to contact this office, call 911, or present to the nearest emergency room should any of these events occur. Discussed crisis intervention services and means to access.     MEDS ORDERED DURING VISIT:  New Medications Ordered This Visit   Medications   • OLANZapine (ZyPREXA) 5 MG tablet     Sig: Take 1 tablet by mouth Every Night for 90 days.     Dispense:  90 tablet     Refill:  0           Follow Up   Return in about 6 weeks (around 10/7/2021), or if symptoms worsen or fail to improve, for Recheck.    Patient was given instructions and counseling regarding her condition or for health maintenance advice. Please see specific information pulled into the AVS if appropriate.     Some of the data in this electronic note has been brought forward from a previous encounter, any necessary changes have been made, it has been reviewed by this APRN, and it is accurate.      This document has been electronically signed by LESLIE Martin  August 26, 2021 13:50 EDT    Part of this note may be an electronic transcription/translation of spoken language to printed text using the Dragon Dictation System.

## 2021-10-01 ENCOUNTER — TRANSCRIBE ORDERS (OUTPATIENT)
Dept: ADMINISTRATIVE | Facility: HOSPITAL | Age: 21
End: 2021-10-01

## 2021-10-01 DIAGNOSIS — R56.9 GENERALIZED-ONSET SEIZURES (HCC): Primary | ICD-10-CM

## 2021-10-04 ENCOUNTER — TELEMEDICINE (OUTPATIENT)
Dept: PSYCHIATRY | Facility: CLINIC | Age: 21
End: 2021-10-04

## 2021-10-04 DIAGNOSIS — F33.2 SEVERE EPISODE OF RECURRENT MAJOR DEPRESSIVE DISORDER, WITHOUT PSYCHOTIC FEATURES (HCC): ICD-10-CM

## 2021-10-04 DIAGNOSIS — F60.3 BORDERLINE PERSONALITY DISORDER (HCC): Chronic | ICD-10-CM

## 2021-10-04 DIAGNOSIS — F43.12 CHRONIC POST-TRAUMATIC STRESS DISORDER (PTSD): ICD-10-CM

## 2021-10-04 DIAGNOSIS — F41.1 GENERALIZED ANXIETY DISORDER: Chronic | ICD-10-CM

## 2021-10-04 PROCEDURE — 99214 OFFICE O/P EST MOD 30 MIN: CPT | Performed by: NURSE PRACTITIONER

## 2021-10-04 RX ORDER — LAMOTRIGINE 100 MG/1
100 TABLET ORAL 2 TIMES DAILY
Qty: 60 TABLET | Refills: 0 | Status: SHIPPED | OUTPATIENT
Start: 2021-10-04 | End: 2021-11-01 | Stop reason: SDUPTHER

## 2021-10-04 RX ORDER — BUSPIRONE HYDROCHLORIDE 15 MG/1
15 TABLET ORAL 3 TIMES DAILY
Qty: 90 TABLET | Refills: 0 | Status: SHIPPED | OUTPATIENT
Start: 2021-10-04 | End: 2021-11-01 | Stop reason: SDUPTHER

## 2021-10-04 RX ORDER — PRAZOSIN HYDROCHLORIDE 2 MG/1
2 CAPSULE ORAL NIGHTLY
Qty: 90 CAPSULE | Refills: 0 | Status: SHIPPED | OUTPATIENT
Start: 2021-10-04 | End: 2022-01-03 | Stop reason: SDUPTHER

## 2021-10-04 NOTE — PROGRESS NOTES
This provider is located at [facility], [address] [facility] [place] [address].The Patient is seen remotely at [address] [place], [place] [address] [address] via [employer]. Patient is being seen via telehealth and confirm that they are in a secure environment for this session. The patient's condition being diagnosed/treated is appropriate for telemedicine. The provider identified himself/herself: herself as well as her credentials.   The patient gave consent to be seen remotely, and when consent is given they understand that the consent allows for patient identifiable information to be sent to a third party as needed.   They may refuse to be seen remotely at any time. The electronic data is encrypted and password protected, and the patient has been advised of the potential risks to privacy not withstanding such measures.    You have chosen to receive care through a telehealth visit.  Do you consent to use a video/audio connection for your medical care today? Yes    Chief Complaint  Follow-up for mood/anxiety    Subjective    Samantha Dubose presents to [facility] for medication management.     History of Present Illness   Patient presents today with her mother as they note the neurologist is planning on doing an EEG as well as MRI.  Patient states that she is having headaches more on one side since her seizures.  She notes Dr. Cheng is the neurologist and he was trying to figure out medication that would work well with the Lamictal and had suggested to take it twice a day to help with the headaches as well as seizures.  Patient reports her depression is fine.  She states she cannot take the Zyprexa as it made her too groggy.  She notes that she has not had any nightmares and she is getting anywhere from 8 to 10 hours of sleep.  Patient reports her appetite is coming back and she was around 115 pounds now she states she is 106 pounds and does report that she is  eating.  Patient states this is 1 year since she has had a cutting episode although she states she has thought about it she has not acted.  Patient states due to personal things her anxiety has been significantly increased as she is constantly worried and on edge with racing thoughts.  Mother notes that she does not want to leave the house and get up and do some of her chores.  Encourage mother that she is going to have to just sit more strict limits as she is an adult and can be a contributor and has to leave the home and get a job.  Patient denied any rash or side effects to the medications.  Denies any SI/HI/AH/VH.       Objective   Vital Signs:   There were no vitals taken for this visit.  Due to the remote nature of this encounter (virtual encounter), vitals were unable to be obtained.  Height stated at 65 inches.  Weight stated at 106 pounds.      Patient screened positive for depression based on a PHQ-9 score of 14 on 5/5/2021. Follow-up recommendations include: see notes.    PHQ-9 Score:   PHQ-9 Total Score:       Mental Status Exam:   Hygiene:   good  Cooperation:  Cooperative  Eye Contact:  Good  Psychomotor Behavior:  Appropriate  Affect:  Appropriate  Mood: normal and anxious  Speech:  Normal  Thought Process:  Goal directed and Linear  Thought Content:  Mood congruent  Suicidal:  None   Homicidal:  None  Hallucinations:  None  Delusion:  None  Memory:  Intact  Orientation:  Person, Place, Time and Situation  Reliability:  good  Insight:  Fair  Judgement:  Fair  Impulse Control:  Fair  Physical/Medical Issues:  No      Current Medications:   Current Outpatient Medications   Medication Sig Dispense Refill   • busPIRone (BUSPAR) 15 MG tablet Take 1 tablet by mouth 3 (Three) Times a Day. 90 tablet 0   • lamoTRIgine (LaMICtal) 100 MG tablet Take 1 tablet by mouth 2 (Two) Times a Day. 60 tablet 0   • levonorgestrel-ethinyl estradiol (LESSINA) 0.1-20 MG-MCG per tablet Take 1 tablet by mouth Daily.     •  prazosin (MINIPRESS) 2 MG capsule Take 1 capsule by mouth Every Night. 90 capsule 0     No current facility-administered medications for this visit.       Physical Exam  Nursing note reviewed.   Constitutional:       Appearance: Normal appearance.   Neurological:      Mental Status: She is alert.   Psychiatric:         Attention and Perception: Attention and perception normal.         Mood and Affect: Affect normal. Mood is anxious and depressed.         Speech: Speech normal.         Behavior: Behavior normal. Behavior is cooperative.         Cognition and Memory: Cognition and memory normal.        Result Review :            Patient refuses all labs.     Assessment and Plan    Problem List Items Addressed This Visit        Mental Health    Generalized anxiety disorder (Chronic)    Relevant Medications    lamoTRIgine (LaMICtal) 100 MG tablet    busPIRone (BUSPAR) 15 MG tablet      Other Visit Diagnoses     Chronic post-traumatic stress disorder (PTSD)        Relevant Medications    lamoTRIgine (LaMICtal) 100 MG tablet    prazosin (MINIPRESS) 2 MG capsule    busPIRone (BUSPAR) 15 MG tablet    Severe episode of recurrent major depressive disorder, without psychotic features (HCC)        Relevant Medications    lamoTRIgine (LaMICtal) 100 MG tablet    busPIRone (BUSPAR) 15 MG tablet    Borderline personality disorder (HCC)  (Chronic)       Relevant Medications    lamoTRIgine (LaMICtal) 100 MG tablet    busPIRone (BUSPAR) 15 MG tablet            TREATMENT PLAN/GOALS: Continue supportive psychotherapy efforts and medications as indicated. Treatment and medication options discussed during today's visit. Patient ackowledged and verbally consented to continue with current treatment plan and was educated on the importance of compliance with treatment and follow-up appointments.    MEDICATION ISSUES:  We discussed risks, benefits, and side effects of the above medications and the patient was agreeable with the plan. Patient  was educated on the importance of compliance with treatment and follow-up appointments.  Patient is agreeable to call the office with any worsening of symptoms or onset of side effects. Patient is agreeable to call 911 or go to the nearest ER should he/she begin having SI/HI. We will continue Lamictal in an effort to stabilize mood.  The patient was reminded to immediately come to the hospital should there be any loss of control.  Explanation was given to her regarding Lamictal and the potential for Dylan Stoney syndrome and significant rash.  Patient was encouraged to check skin prior to beginning.  Patient was encouraged to report any rash and to immediately stop medication.    -Change lamotrigine to 100 mg twice daily for mood as well as headaches and seizures.  Informed mother to let Dr. Cheng aware of the changes.  -Increase BuSpar to 15 mg 3 times a day for worsening anxiety.  -Continue Minipress 2 mg at night for nightmares.  -Discontinue Zyprexa as it makes her too drowsy.  Patient has failed and tried risperidone, Adderall, citalopram, trazodone, sertraline, amitriptyline, aripiprazole, quetiapine, fluoxetine, and bupropion.    -Encourage patient to keep her therapy appointment to help with her coping skills.    -Highly encourage patient that she has to get out of the home and she needs to have engaged schedule and routine and will be looking for a job to help with productivity and getting out around people.      Counseled patient regarding multimodal approach with healthy nutrition, healthy sleep, regular physical activity, social activities, counseling, and medications.      Coping skills reviewed and encouraged positive framing of thoughts     Assisted patient in processing above session content; acknowledged and normalized patient’s thoughts, feelings, and concerns.  Applied  positive coping skills and behavior management in session.  Allowed patient to freely discuss issues without interruption or  judgment. Provided safe, confidential environment to facilitate the development of positive therapeutic relationship and encourage open, honest communication. Assisted patient in identifying risk factors which would indicate the need for higher level of care including thoughts to harm self or others and/or self-harming behavior and encouraged patient to contact this office, call 911, or present to the nearest emergency room should any of these events occur. Discussed crisis intervention services and means to access.     MEDS ORDERED DURING VISIT:  New Medications Ordered This Visit   Medications   • lamoTRIgine (LaMICtal) 100 MG tablet     Sig: Take 1 tablet by mouth 2 (Two) Times a Day.     Dispense:  60 tablet     Refill:  0   • prazosin (MINIPRESS) 2 MG capsule     Sig: Take 1 capsule by mouth Every Night.     Dispense:  90 capsule     Refill:  0   • busPIRone (BUSPAR) 15 MG tablet     Sig: Take 1 tablet by mouth 3 (Three) Times a Day.     Dispense:  90 tablet     Refill:  0           Follow Up   Return in about 4 weeks (around 11/1/2021), or if symptoms worsen or fail to improve, for Recheck.    Patient was given instructions and counseling regarding her condition or for health maintenance advice. Please see specific information pulled into the AVS if appropriate.     Some of the data in this electronic note has been brought forward from a previous encounter, any necessary changes have been made, it has been reviewed by this APRN, and it is accurate.      This document has been electronically signed by LESLIE Martin  October 4, 2021 14:58 EDT    Part of this note may be an electronic transcription/translation of spoken language to printed text using the Dragon Dictation System.

## 2021-10-05 ENCOUNTER — TRANSCRIBE ORDERS (OUTPATIENT)
Dept: ADMINISTRATIVE | Facility: HOSPITAL | Age: 21
End: 2021-10-05

## 2021-10-05 DIAGNOSIS — R56.9 GENERALIZED-ONSET SEIZURES (HCC): Primary | ICD-10-CM

## 2021-10-14 ENCOUNTER — HOSPITAL ENCOUNTER (OUTPATIENT)
Dept: RESPIRATORY THERAPY | Facility: HOSPITAL | Age: 21
Discharge: HOME | End: 2021-10-14
Admitting: PSYCHIATRY & NEUROLOGY

## 2021-10-14 DIAGNOSIS — R56.9 GENERALIZED-ONSET SEIZURES (HCC): ICD-10-CM

## 2021-10-14 PROCEDURE — 95819 EEG AWAKE AND ASLEEP: CPT

## 2021-10-21 ENCOUNTER — HOSPITAL ENCOUNTER (OUTPATIENT)
Dept: RESPIRATORY THERAPY | Facility: HOSPITAL | Age: 21
Discharge: HOME | End: 2021-10-21

## 2021-10-21 ENCOUNTER — HOSPITAL ENCOUNTER (OUTPATIENT)
Dept: MRI IMAGING | Facility: HOSPITAL | Age: 21
Discharge: HOME | End: 2021-10-21

## 2021-10-21 DIAGNOSIS — R56.9 GENERALIZED-ONSET SEIZURES (HCC): ICD-10-CM

## 2021-10-21 PROCEDURE — 70551 MRI BRAIN STEM W/O DYE: CPT | Performed by: RADIOLOGY

## 2021-10-21 PROCEDURE — 70551 MRI BRAIN STEM W/O DYE: CPT

## 2021-10-21 PROCEDURE — 95819 EEG AWAKE AND ASLEEP: CPT

## 2021-10-28 ENCOUNTER — HOSPITAL ENCOUNTER (OUTPATIENT)
Dept: RESPIRATORY THERAPY | Facility: HOSPITAL | Age: 21
Discharge: HOME | End: 2021-10-28
Admitting: PSYCHIATRY & NEUROLOGY

## 2021-10-28 DIAGNOSIS — R56.9 GENERALIZED-ONSET SEIZURES (HCC): ICD-10-CM

## 2021-10-28 PROCEDURE — 95819 EEG AWAKE AND ASLEEP: CPT

## 2021-11-01 ENCOUNTER — TELEMEDICINE (OUTPATIENT)
Dept: PSYCHIATRY | Facility: CLINIC | Age: 21
End: 2021-11-01

## 2021-11-01 DIAGNOSIS — F43.12 CHRONIC POST-TRAUMATIC STRESS DISORDER (PTSD): ICD-10-CM

## 2021-11-01 DIAGNOSIS — F60.3 BORDERLINE PERSONALITY DISORDER (HCC): Primary | ICD-10-CM

## 2021-11-01 DIAGNOSIS — F33.2 SEVERE EPISODE OF RECURRENT MAJOR DEPRESSIVE DISORDER, WITHOUT PSYCHOTIC FEATURES (HCC): ICD-10-CM

## 2021-11-01 DIAGNOSIS — F41.1 GENERALIZED ANXIETY DISORDER: ICD-10-CM

## 2021-11-01 PROCEDURE — 99214 OFFICE O/P EST MOD 30 MIN: CPT | Performed by: NURSE PRACTITIONER

## 2021-11-01 RX ORDER — BUSPIRONE HYDROCHLORIDE 15 MG/1
15 TABLET ORAL 3 TIMES DAILY
Qty: 90 TABLET | Refills: 2 | Status: SHIPPED | OUTPATIENT
Start: 2021-11-01 | End: 2022-01-03 | Stop reason: SDUPTHER

## 2021-11-01 RX ORDER — LAMOTRIGINE 100 MG/1
100 TABLET ORAL 2 TIMES DAILY
Qty: 60 TABLET | Refills: 2 | Status: SHIPPED | OUTPATIENT
Start: 2021-11-01 | End: 2022-01-03 | Stop reason: SDUPTHER

## 2021-11-01 NOTE — PROGRESS NOTES
"This provider is located at [facility], [address] [facility] [place] [address].The Patient is seen remotely at [address] [place] [address] Dr. [place]ópez [place] [address] via [employer]. Patient is being seen via telehealth and confirm that they are in a secure environment for this session. The patient's condition being diagnosed/treated is appropriate for telemedicine. The provider identified himself/herself: herself as well as her credentials.   The patient gave consent to be seen remotely, and when consent is given they understand that the consent allows for patient identifiable information to be sent to a third party as needed.   They may refuse to be seen remotely at any time. The electronic data is encrypted and password protected, and the patient has been advised of the potential risks to privacy not withstanding such measures.    You have chosen to receive care through a telehealth visit.  Do you consent to use a video/audio connection for your medical care today? Yes    Chief Complaint  Follow-up for mood/anxiety    Subjective    Samantha Dubose presents to [facility] for medication management.     History of Present Illness   Patient presents today with her mother.  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  Patient's mother states that she did not go to her neurology appointment as they changed it to the morning and she would not get up.  According to Dr. Cheng the MRI and EEG were normal just slightly slow activity no seizures in his opinion but did state that she probably would need a second opinion.  When asking the patient how she was doing she stated that she did not want to talk I asked her regarding her mood she said \"moods fine I guess\".  She states that her depression anxiety and sleep are all \"fine\".  Patient denied any side effects or any issues to the medications.  She states that she has had occasional suicidal ideation but " adamantly denies any plan or intent and stated that she would not elaborate on anything going on but she does not have any plan or intent.  Denies any self-harm.  Patient is guarded throughout the visit and makes poor eye contact and states that she does not want to talk or elaborate.  Denied any current SI/HI/AH/VH.      Objective   Vital Signs:   There were no vitals taken for this visit.  Due to the remote nature of this encounter (virtual encounter), vitals were unable to be obtained.  Height stated at 65 inches.  Weight stated at 106 pounds.      Patient screened positive for depression based on a PHQ-9 score of 14 on 5/5/2021. Follow-up recommendations include: see notes.    PHQ-9 Score:   PHQ-9 Total Score:       Mental Status Exam:   Hygiene:   good  Cooperation:  Guarded  Eye Contact:  Poor  Psychomotor Behavior:  Appropriate  Affect:  Angry  Mood: normal, anxious and irritable  Speech:  Normal  Thought Process:  Goal directed and Linear  Thought Content:  Mood congruent  Suicidal:  None   Homicidal:  None  Hallucinations:  None  Delusion:  None  Memory:  Intact  Orientation:  Person, Place, Time and Situation  Reliability:  good  Insight:  Fair  Judgement:  Fair  Impulse Control:  Fair  Physical/Medical Issues:  No      Current Medications:   Current Outpatient Medications   Medication Sig Dispense Refill   • busPIRone (BUSPAR) 15 MG tablet Take 1 tablet by mouth 3 (Three) Times a Day. 90 tablet 2   • lamoTRIgine (LaMICtal) 100 MG tablet Take 1 tablet by mouth 2 (Two) Times a Day. 60 tablet 2   • levonorgestrel-ethinyl estradiol (LESSINA) 0.1-20 MG-MCG per tablet Take 1 tablet by mouth Daily.     • prazosin (MINIPRESS) 2 MG capsule Take 1 capsule by mouth Every Night. 90 capsule 0     No current facility-administered medications for this visit.       Physical Exam  Nursing note reviewed.   Constitutional:       Appearance: Normal appearance.   Neurological:      Mental Status: She is alert.   Psychiatric:          Attention and Perception: Attention and perception normal.         Mood and Affect: Mood is anxious and depressed. Affect is angry.         Speech: Speech normal.         Behavior: Behavior is agitated. Behavior is cooperative.         Cognition and Memory: Cognition and memory normal.        Result Review :            Patient refuses all labs.     Assessment and Plan    Problem List Items Addressed This Visit        Mental Health    Generalized anxiety disorder (Chronic)    Relevant Medications    busPIRone (BUSPAR) 15 MG tablet    lamoTRIgine (LaMICtal) 100 MG tablet      Other Visit Diagnoses     Borderline personality disorder (HCC)    -  Primary    Relevant Medications    busPIRone (BUSPAR) 15 MG tablet    lamoTRIgine (LaMICtal) 100 MG tablet    Severe episode of recurrent major depressive disorder, without psychotic features (HCC)        Relevant Medications    busPIRone (BUSPAR) 15 MG tablet    lamoTRIgine (LaMICtal) 100 MG tablet    Chronic post-traumatic stress disorder (PTSD)        Relevant Medications    busPIRone (BUSPAR) 15 MG tablet    lamoTRIgine (LaMICtal) 100 MG tablet            TREATMENT PLAN/GOALS: Continue supportive psychotherapy efforts and medications as indicated. Treatment and medication options discussed during today's visit. Patient ackowledged and verbally consented to continue with current treatment plan and was educated on the importance of compliance with treatment and follow-up appointments.    MEDICATION ISSUES:  We discussed risks, benefits, and side effects of the above medications and the patient was agreeable with the plan. Patient was educated on the importance of compliance with treatment and follow-up appointments.  Patient is agreeable to call the office with any worsening of symptoms or onset of side effects. Patient is agreeable to call 911 or go to the nearest ER should he/she begin having SI/HI. We will continue Lamictal in an effort to stabilize mood.  The  patient was reminded to immediately come to the hospital should there be any loss of control.  Explanation was given to her regarding Lamictal and the potential for Dylan Stoney syndrome and significant rash.  Patient was encouraged to check skin prior to beginning.  Patient was encouraged to report any rash and to immediately stop medication. Patient was strongly encouraged to continue birth control.  Patient was counseled regarding need not to become pregnant prior to discussion and possible titration and discontinuation of medications.  An explanation was provided to the patient regarding the risk of fetal harm with psychotrophic medications.  Patient was provided education regarding both risk of continuing and discontinuing medications during pregnancy.  Patient verbalized understanding.      -Continue lamotrigine to 100 mg twice daily for mood as well as headaches and seizures.  Informed mother to let Dr. Cheng aware of the changes.  -Continue BuSpar  15 mg 3 times a day for worsening anxiety.  -Continue Minipress 2 mg at night for nightmares.  -Discontinue Zyprexa as it makes her too drowsy.  Patient has failed and tried risperidone, Adderall, citalopram, trazodone, sertraline, amitriptyline, aripiprazole, quetiapine, fluoxetine, and bupropion.    -Encourage patient to keep her therapy appointment to help with her coping skills.    -Went over safety plan with patient as she adamantly denied any plan or intent.  Highly encourage patient that she needs to be focal at the next visit and would follow-up at the first of the year but if she had any worsening symptoms to call.  Encourage patient that if she was not going to be appropriate during visits and answer honestly then other steps would need to be taken and not keeping appointments patient verbalized understanding.      Counseled patient regarding multimodal approach with healthy nutrition, healthy sleep, regular physical activity, social activities,  counseling, and medications.      Coping skills reviewed and encouraged positive framing of thoughts     Assisted patient in processing above session content; acknowledged and normalized patient’s thoughts, feelings, and concerns.  Applied  positive coping skills and behavior management in session.  Allowed patient to freely discuss issues without interruption or judgment. Provided safe, confidential environment to facilitate the development of positive therapeutic relationship and encourage open, honest communication. Assisted patient in identifying risk factors which would indicate the need for higher level of care including thoughts to harm self or others and/or self-harming behavior and encouraged patient to contact this office, call 911, or present to the nearest emergency room should any of these events occur. Discussed crisis intervention services and means to access.     MEDS ORDERED DURING VISIT:  New Medications Ordered This Visit   Medications   • busPIRone (BUSPAR) 15 MG tablet     Sig: Take 1 tablet by mouth 3 (Three) Times a Day.     Dispense:  90 tablet     Refill:  2   • lamoTRIgine (LaMICtal) 100 MG tablet     Sig: Take 1 tablet by mouth 2 (Two) Times a Day.     Dispense:  60 tablet     Refill:  2           Follow Up   Return in about 10 weeks (around 1/10/2022), or if symptoms worsen or fail to improve, for Recheck.    Patient was given instructions and counseling regarding her condition or for health maintenance advice. Please see specific information pulled into the AVS if appropriate.     Some of the data in this electronic note has been brought forward from a previous encounter, any necessary changes have been made, it has been reviewed by this APRN, and it is accurate.      This document has been electronically signed by LESLIE Martin  November 1, 2021 13:49 EDT    Part of this note may be an electronic transcription/translation of spoken language to printed text using the Dragon  Dictation System.

## 2021-12-18 NOTE — PROGRESS NOTES
"Subjective   Samantha Dubose is a 19 y.o. female who is here today for medication management follow up.    Chief Complaint: follow-up for depression anxiety and depression     History of Present Illness   Patient presents today with her mother noting that she has been doing very well.  Patient states \"I really like the medicine I consider it my happy pill\".  Patient reports that she has not had any depressive symptoms and her anxiety has been well managed.  Patient states that her mood has been well under control and she does no longer feel as if she is on a roller coaster.  Patient states that she has been taking her medicine as scheduled and has also been working on her school things.  Patient states that she plans on working more towards her [employer] now and looking into nursing.  Patient also notes that she has been going to tutoring for math which started last week.  Mother states that she has been getting up and doing chores and been active around the home.  Patient states that her sleep has been better as she is getting on a better sleep schedule routine.  Patient states that she still takes 2 ZzzQuil tablets at night to help with her sleep but it has been better.  Patient states that she is taking the BuSpar and Lamictal as directed and notes that anxiety and depression have been better.  Patient denies any nightmares.  Patient was states that her appetite has been good as she has been eating more on a regular basis.  Mother states that it has been a long time since she has seen her like this so they are just worried but otherwise she has been happy and her mood has been good and sleeping as improved.  Patient and mother both denied any side effects or rash to the medication.  Patient states that she is doing well.  Patient denied any SI or HI.  Patient denied any auditory or visual hallucinations.  Patient denied any thoughts of self-harm.        The following portions of the patient's history were reviewed and " "updated as appropriate: allergies, current medications, past family history, past medical history, past social history, past surgical history and problem list.    Review of Systems   Constitutional: Negative for appetite change.   Neurological: Negative for dizziness.   Psychiatric/Behavioral: Negative.  Negative for agitation, behavioral problems, dysphoric mood, self-injury and sleep disturbance. The patient is not nervous/anxious.        Objective   Physical Exam   Constitutional: She appears well-developed and well-nourished.   Psychiatric: She has a normal mood and affect. Her speech is normal and behavior is normal. Judgment and thought content normal. Her mood appears not anxious. Her affect is not angry. She is not agitated. Cognition and memory are normal. She does not express impulsivity. She does not exhibit a depressed mood. She is attentive.   Vitals reviewed.    Blood pressure 112/77, pulse 82, height 165.1 cm (65\"), weight 58.7 kg (129 lb 6.4 oz). Body mass index is 21.53 kg/m².      No Known Allergies    Current Medications:   Current Outpatient Medications   Medication Sig Dispense Refill   • busPIRone (BUSPAR) 10 MG tablet Take 1 tablet by mouth 2 (Two) Times a Day. 60 tablet 2   • lamoTRIgine (LAMICTAL) 25 MG tablet Take 1 tablet by mouth Daily. 30 tablet 2   • levonorgestrel-ethinyl estradiol (LESSINA) 0.1-20 MG-MCG per tablet Take 1 tablet by mouth Daily.     • prazosin (MINIPRESS) 2 MG capsule Take 1 capsule by mouth Every Night. 30 capsule 2     No current facility-administered medications for this visit.        Mental Status Exam:   Hygiene:   good  Cooperation: Cooperative  Eye Contact: Good  Psychomotor Behavior: Appropriate   Affect:  Full range  Hopelessness: Denies  Speech:  Normal  Thought Process:  Goal directed  Thought Content:  Mood congruent   Suicidal:  None  Homicidal:  None  Hallucinations:  None  Delusion:  None  Memory:  Intact  Orientation:  Person, Place, Time and " Situation  Reliability:  fair  Insight:  Fair  Judgement:  Fair  Impulse Control:  Fair  Physical/Medical Issues:  No     Assessment/Plan   Diagnoses and all orders for this visit:    Generalized anxiety disorder  -     busPIRone (BUSPAR) 10 MG tablet; Take 1 tablet by mouth 2 (Two) Times a Day.  -     lamoTRIgine (LAMICTAL) 25 MG tablet; Take 1 tablet by mouth Daily.    Chronic post-traumatic stress disorder (PTSD)  -     prazosin (MINIPRESS) 2 MG capsule; Take 1 capsule by mouth Every Night.  -     lamoTRIgine (LAMICTAL) 25 MG tablet; Take 1 tablet by mouth Daily.    Mild episode of recurrent major depressive disorder (CMS/HCC)  -     lamoTRIgine (LAMICTAL) 25 MG tablet; Take 1 tablet by mouth Daily.    Borderline personality disorder (CMS/HCC)  -     lamoTRIgine (LAMICTAL) 25 MG tablet; Take 1 tablet by mouth Daily.        Discussed medication management as well as side effects in detail with the patient and her mother.  Praised patient for taking the medication as prescribed as well as working towards her GED and setting goals for herself and being more active around the home.  Highly encouraged patient to continue with the medication and praised her once again for trying the medication and staying on it as well as being more productive around the home and setting goals for herself.    Continue lamotrigine 25 mg daily for depression and generalized anxiety as well as BPD.  Also instructed patient and mother that if she does have a decline in some of her symptoms that we may have to increase to 50 mg and to contact the [facility] both agreeable and aware as a states she has been doing good overall and denies any side effects.  Continue BuSpar 10 mg twice daily for anxiety, highly encouraged the patient that she will have to take twice a day to note effectiveness.  Continue Minipress 2 mg at night for nightmares.  Discussed the risks, beneefits, and side effects of the medications; patient ackowledged and  verbally consented.  Patient is aware to call the [facility] with any worsening of symptoms.  Patient is agreeable to call 911 or go to the nearest ER should he/she begin having SI/HI. Patient was strongly encouraged to continue birth control.  Patient was counseled regarding need not to become pregnant prior to discussion and possible titration and discontinuation of medications.  An explanation was provided to the patient regarding the risk of fetal harm with psychotrophic medications.  Patient was provided education regarding both risk of continuing and discontinuing medications during pregnancy.  Patient verbalized understanding. We will continue Lamictal in an effort to stabilize mood.  The patient was reminded to immediately come to the hospital should there be any loss of control.  Explanation was given to her regarding Lamictal and the potential for Dylan Stoney syndrome and significant rash.  Patient was encouraged to check skin prior to beginning.  Patient was encouraged to report any rash and to immediately stop medication.      Prognosis: Guarded dependent on medication/follow up and treatment plan compliance.   Functionality: pt showing improvements in important areas of daily functioning regarding her mood as well as anxiety will continue current medication regimen over the next 4 weeks.  Patient will follow-up in 4 weeks, encourage patient and the mother that if they had any questions or concerns or worsening depressive symptoms to contact the [facility] for sooner appointment both agreed.    Errors in dictation may reflect use of voice recognition software and not all errors in transcription may have been detected prior to signing.               no peripheral edema/no syncope

## 2022-01-03 ENCOUNTER — TELEMEDICINE (OUTPATIENT)
Dept: PSYCHIATRY | Facility: CLINIC | Age: 22
End: 2022-01-03

## 2022-01-03 DIAGNOSIS — F33.2 SEVERE EPISODE OF RECURRENT MAJOR DEPRESSIVE DISORDER, WITHOUT PSYCHOTIC FEATURES: ICD-10-CM

## 2022-01-03 DIAGNOSIS — G47.9 SLEEPING DIFFICULTY: ICD-10-CM

## 2022-01-03 DIAGNOSIS — F43.12 CHRONIC POST-TRAUMATIC STRESS DISORDER (PTSD): ICD-10-CM

## 2022-01-03 DIAGNOSIS — F41.1 GENERALIZED ANXIETY DISORDER: ICD-10-CM

## 2022-01-03 DIAGNOSIS — F60.3 BORDERLINE PERSONALITY DISORDER: Primary | ICD-10-CM

## 2022-01-03 PROCEDURE — 99214 OFFICE O/P EST MOD 30 MIN: CPT | Performed by: NURSE PRACTITIONER

## 2022-01-03 RX ORDER — PRAZOSIN HYDROCHLORIDE 2 MG/1
2 CAPSULE ORAL NIGHTLY
Qty: 90 CAPSULE | Refills: 0 | Status: SHIPPED | OUTPATIENT
Start: 2022-01-03 | End: 2022-04-25

## 2022-01-03 RX ORDER — LAMOTRIGINE 100 MG/1
100 TABLET ORAL 2 TIMES DAILY
Qty: 60 TABLET | Refills: 2 | Status: SHIPPED | OUTPATIENT
Start: 2022-01-03 | End: 2022-04-14 | Stop reason: SDUPTHER

## 2022-01-03 RX ORDER — BUSPIRONE HYDROCHLORIDE 15 MG/1
15 TABLET ORAL 3 TIMES DAILY
Qty: 90 TABLET | Refills: 2 | Status: SHIPPED | OUTPATIENT
Start: 2022-01-03 | End: 2022-04-14 | Stop reason: SDUPTHER

## 2022-01-03 NOTE — PROGRESS NOTES
"This provider is located at [facility], [address] [place] [address].The Patient is seen remotely at [address] [place] [address] Dr. [place] [address] via [employer]. Patient is being seen via telehealth and confirm that they are in a secure environment for this session. The patient's condition being diagnosed/treated is appropriate for telemedicine. The provider identified himself/herself: herself as well as her credentials.   The patient gave consent to be seen remotely, and when consent is given they understand that the consent allows for patient identifiable information to be sent to a third party as needed.   They may refuse to be seen remotely at any time. The electronic data is encrypted and password protected, and the patient has been advised of the potential risks to privacy not withstanding such measures.    You have chosen to receive care through a telehealth visit.  Do you consent to use a video/audio connection for your medical care today? Yes    Chief Complaint  Follow-up for mood/anxiety    Subjective    Samantha Dubose presents to [facility] for medication management.     History of Present Illness   Patient presents today by herself but her father is present.  Patient states that she is doing great as she had a good [place] and had [employer] and art sets.  Patient when asked about her mood she stated \"I do not know\" then asked her father had her mood has been.  Father stated that he has been much better when she takes her medications.  Patient reports that she is still forgetful at times.  Patient also notes that her depression is slightly up and has had thoughts of self-harm but has not followed through.  Encourage patient she needs to get an alarm and be compliant with her medications to help with her depression.  Patient denies any self-harm at this time.  She denies any side effects or rash to the medications.  Patient reports appetite " is unchanged.  She notes that for her sleep she is restless that she goes to sleep at 3 or 4 AM but does not wake up till 12 or 1 PM.  Denies any current SI/HI/AH/VH.      Objective   Vital Signs:   There were no vitals taken for this visit.  Due to the remote nature of this encounter (virtual encounter), vitals were unable to be obtained.  Height stated at 65 inches.  Weight stated at 106 pounds.      Patient screened positive for depression based on a PHQ-9 score of 14 on 5/5/2021. Follow-up recommendations include: see notes.    PHQ-9 Score:   PHQ-9 Total Score:       Mental Status Exam:   Hygiene:   good  Cooperation:  Guarded  Eye Contact:  Poor  Psychomotor Behavior:  Appropriate  Affect:  Angry  Mood: normal, anxious and irritable  Speech:  Normal  Thought Process:  Goal directed and Linear  Thought Content:  Mood congruent  Suicidal:  None   Homicidal:  None  Hallucinations:  None  Delusion:  None  Memory:  Intact  Orientation:  Person, Place, Time and Situation  Reliability:  good  Insight:  Fair  Judgement:  Fair  Impulse Control:  Fair  Physical/Medical Issues:  No      Current Medications:   Current Outpatient Medications   Medication Sig Dispense Refill   • busPIRone (BUSPAR) 15 MG tablet Take 1 tablet by mouth 3 (Three) Times a Day. 90 tablet 2   • lamoTRIgine (LaMICtal) 100 MG tablet Take 1 tablet by mouth 2 (Two) Times a Day. 60 tablet 2   • levonorgestrel-ethinyl estradiol (LESSINA) 0.1-20 MG-MCG per tablet Take 1 tablet by mouth Daily.     • prazosin (MINIPRESS) 2 MG capsule Take 1 capsule by mouth Every Night. 90 capsule 0     No current facility-administered medications for this visit.       Physical Exam  Nursing note reviewed.   Constitutional:       Appearance: Normal appearance.   Neurological:      Mental Status: She is alert.   Psychiatric:         Attention and Perception: Attention and perception normal.         Mood and Affect: Mood is depressed. Mood is not anxious. Affect is not angry.          Speech: Speech normal.         Behavior: Behavior is not agitated. Behavior is cooperative.         Cognition and Memory: Cognition and memory normal.        Result Review :            Patient refuses all labs.     Assessment and Plan    Problem List Items Addressed This Visit        Mental Health    Generalized anxiety disorder (Chronic)    Relevant Medications    lamoTRIgine (LaMICtal) 100 MG tablet    busPIRone (BUSPAR) 15 MG tablet      Other Visit Diagnoses     Borderline personality disorder (HCC)    -  Primary    Relevant Medications    lamoTRIgine (LaMICtal) 100 MG tablet    busPIRone (BUSPAR) 15 MG tablet    Severe episode of recurrent major depressive disorder, without psychotic features (HCC)        Relevant Medications    lamoTRIgine (LaMICtal) 100 MG tablet    busPIRone (BUSPAR) 15 MG tablet    Sleeping difficulty        Relevant Medications    prazosin (MINIPRESS) 2 MG capsule    Chronic post-traumatic stress disorder (PTSD)        Relevant Medications    prazosin (MINIPRESS) 2 MG capsule    lamoTRIgine (LaMICtal) 100 MG tablet    busPIRone (BUSPAR) 15 MG tablet            TREATMENT PLAN/GOALS: Continue supportive psychotherapy efforts and medications as indicated. Treatment and medication options discussed during today's visit. Patient ackowledged and verbally consented to continue with current treatment plan and was educated on the importance of compliance with treatment and follow-up appointments.    MEDICATION ISSUES:  We discussed risks, benefits, and side effects of the above medications and the patient was agreeable with the plan. Patient was educated on the importance of compliance with treatment and follow-up appointments.  Patient is agreeable to call the office with any worsening of symptoms or onset of side effects. Patient is agreeable to call 911 or go to the nearest ER should he/she begin having SI/HI. We will continue Lamictal in an effort to stabilize mood.  The patient was  reminded to immediately come to the hospital should there be any loss of control.  Explanation was given to her regarding Lamictal and the potential for Dylan Stoney syndrome and significant rash.  Patient was encouraged to check skin prior to beginning.  Patient was encouraged to report any rash and to immediately stop medication. Patient was strongly encouraged to continue birth control.  Patient was counseled regarding need not to become pregnant prior to discussion and possible titration and discontinuation of medications.  An explanation was provided to the patient regarding the risk of fetal harm with psychotrophic medications.  Patient was provided education regarding both risk of continuing and discontinuing medications during pregnancy.  Patient verbalized understanding.      -Continue lamotrigine to 100 mg twice daily for mood as well as headaches and seizures.  Informed mother to let Dr. Cheng aware of the changes.  -Continue BuSpar  15 mg 3 times a day for worsening anxiety.  -Continue Minipress 2 mg at night for nightmares.  -Discontinue Zyprexa as it makes her too drowsy.  Patient has failed and tried risperidone, Adderall, citalopram, trazodone, sertraline, amitriptyline, aripiprazole, quetiapine, fluoxetine, and bupropion.      -Encourage patient to keep her therapy appointment to help with her coping skills.    -Encourage compliance with medication as well as getting an alarm to set to help with compliance patient verbalized understanding.      Counseled patient regarding multimodal approach with healthy nutrition, healthy sleep, regular physical activity, social activities, counseling, and medications.      Coping skills reviewed and encouraged positive framing of thoughts     Assisted patient in processing above session content; acknowledged and normalized patient’s thoughts, feelings, and concerns.  Applied  positive coping skills and behavior management in session.  Allowed patient to freely  discuss issues without interruption or judgment. Provided safe, confidential environment to facilitate the development of positive therapeutic relationship and encourage open, honest communication. Assisted patient in identifying risk factors which would indicate the need for higher level of care including thoughts to harm self or others and/or self-harming behavior and encouraged patient to contact this office, call 911, or present to the nearest emergency room should any of these events occur. Discussed crisis intervention services and means to access.     MEDS ORDERED DURING VISIT:  New Medications Ordered This Visit   Medications   • prazosin (MINIPRESS) 2 MG capsule     Sig: Take 1 capsule by mouth Every Night.     Dispense:  90 capsule     Refill:  0   • lamoTRIgine (LaMICtal) 100 MG tablet     Sig: Take 1 tablet by mouth 2 (Two) Times a Day.     Dispense:  60 tablet     Refill:  2   • busPIRone (BUSPAR) 15 MG tablet     Sig: Take 1 tablet by mouth 3 (Three) Times a Day.     Dispense:  90 tablet     Refill:  2           Follow Up   Return in about 8 weeks (around 2/28/2022), or if symptoms worsen or fail to improve, for Recheck.    Patient was given instructions and counseling regarding her condition or for health maintenance advice. Please see specific information pulled into the AVS if appropriate.     Some of the data in this electronic note has been brought forward from a previous encounter, any necessary changes have been made, it has been reviewed by this APRN, and it is accurate.      This document has been electronically signed by LESLIE Martin  January 3, 2022 13:58 EST    Part of this note may be an electronic transcription/translation of spoken language to printed text using the Dragon Dictation System.

## 2022-02-28 ENCOUNTER — TELEMEDICINE (OUTPATIENT)
Dept: PSYCHIATRY | Facility: CLINIC | Age: 22
End: 2022-02-28

## 2022-02-28 DIAGNOSIS — F33.0 MILD EPISODE OF RECURRENT MAJOR DEPRESSIVE DISORDER: ICD-10-CM

## 2022-02-28 DIAGNOSIS — F60.3 BORDERLINE PERSONALITY DISORDER: Primary | ICD-10-CM

## 2022-02-28 DIAGNOSIS — F41.1 GENERALIZED ANXIETY DISORDER: ICD-10-CM

## 2022-02-28 PROCEDURE — 99214 OFFICE O/P EST MOD 30 MIN: CPT | Performed by: NURSE PRACTITIONER

## 2022-02-28 NOTE — PROGRESS NOTES
This provider is located at [facility], [address] [place] [address].The Patient is seen remotely at [address] [place], [place] [address]on [place] [address] via [employer]. Patient is being seen via telehealth and confirm that they are in a secure environment for this session. The patient's condition being diagnosed/treated is appropriate for telemedicine. The provider identified himself/herself: herself as well as her credentials.   The patient gave consent to be seen remotely, and when consent is given they understand that the consent allows for patient identifiable information to be sent to a third party as needed.   They may refuse to be seen remotely at any time. The electronic data is encrypted and password protected, and the patient has been advised of the potential risks to privacy not withstanding such measures.    You have chosen to receive care through a telehealth visit.  Do you consent to use a video/audio connection for your medical care today? Yes    Chief Complaint  Follow-up for mood/anxiety    Subjective    Samantha Dubose presents to [facility] for medication management.     History of Present Illness   Patient presents today reporting that she is doing good.  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  She reports her depression has been good.  She states that she is sleeping better with the job now.  She denies any side effects to the medications or rash.  She states that it is unknown if she has had a seizure or not mother denied any major issues or concerns.  Patient reports that she has had a few panic attacks when feeling overwhelmed at work and will take an extra BuSpar which has been helpful as she is usually only taking twice a day.  Appetite is unchanged.  Denies any SI/HI/AH/VH.      Objective   Vital Signs:   There were no vitals taken for this  visit.  Due to the remote nature of this encounter (virtual encounter), vitals were unable to be obtained.  Height stated at 65 inches.  Weight stated at 106 pounds.      Patient screened positive for depression based on a PHQ-9 score of 14 on 5/5/2021. Follow-up recommendations include: see notes.    PHQ-9 Score:   PHQ-9 Total Score:       Mental Status Exam:   Hygiene:   good  Cooperation:  Cooperative  Eye Contact:  Good  Psychomotor Behavior:  Appropriate  Affect:  Full range  Mood: normal  Speech:  Normal  Thought Process:  Goal directed and Linear  Thought Content:  Mood congruent  Suicidal:  None   Homicidal:  None  Hallucinations:  None  Delusion:  None  Memory:  Intact  Orientation:  Person, Place, Time and Situation  Reliability:  good  Insight:  Fair  Judgement:  Fair  Impulse Control:  Fair  Physical/Medical Issues:  No      Current Medications:   Current Outpatient Medications   Medication Sig Dispense Refill   • busPIRone (BUSPAR) 15 MG tablet Take 1 tablet by mouth 3 (Three) Times a Day. 90 tablet 2   • lamoTRIgine (LaMICtal) 100 MG tablet Take 1 tablet by mouth 2 (Two) Times a Day. 60 tablet 2   • levonorgestrel-ethinyl estradiol (LESSINA) 0.1-20 MG-MCG per tablet Take 1 tablet by mouth Daily.     • prazosin (MINIPRESS) 2 MG capsule Take 1 capsule by mouth Every Night. 90 capsule 0     No current facility-administered medications for this visit.       Physical Exam  Nursing note reviewed.   Constitutional:       Appearance: Normal appearance.   Neurological:      Mental Status: She is alert.   Psychiatric:         Attention and Perception: Attention and perception normal.         Mood and Affect: Mood and affect normal. Mood is not anxious or depressed. Affect is not angry.         Speech: Speech normal.         Behavior: Behavior is not agitated. Behavior is cooperative.         Thought Content: Thought content normal.         Cognition and Memory: Cognition and memory normal.        Result Review :             Patient refuses all labs.     Assessment and Plan    Problem List Items Addressed This Visit        Mental Health    Generalized anxiety disorder (Chronic)    Mild episode of recurrent major depressive disorder (HCC)      Other Visit Diagnoses     Borderline personality disorder (HCC)    -  Primary            TREATMENT PLAN/GOALS: Continue supportive psychotherapy efforts and medications as indicated. Treatment and medication options discussed during today's visit. Patient ackowledged and verbally consented to continue with current treatment plan and was educated on the importance of compliance with treatment and follow-up appointments.    MEDICATION ISSUES:  We discussed risks, benefits, and side effects of the above medications and the patient was agreeable with the plan. Patient was educated on the importance of compliance with treatment and follow-up appointments.  Patient is agreeable to call the office with any worsening of symptoms or onset of side effects. Patient is agreeable to call 911 or go to the nearest ER should he/she begin having SI/HI. We will continue Lamictal in an effort to stabilize mood.  The patient was reminded to immediately come to the hospital should there be any loss of control.  Explanation was given to her regarding Lamictal and the potential for Dylan Stoney syndrome and significant rash.  Patient was encouraged to check skin prior to beginning.  Patient was encouraged to report any rash and to immediately stop medication. Patient was strongly encouraged to continue birth control.  Patient was counseled regarding need not to become pregnant prior to discussion and possible titration and discontinuation of medications.  An explanation was provided to the patient regarding the risk of fetal harm with psychotrophic medications.  Patient was provided education regarding both risk of continuing and discontinuing medications during pregnancy.  Patient verbalized  understanding.      -Continue lamotrigine to 100 mg twice daily for mood as well as headaches and seizures. As Dr. Cheng recommended as well.   -Continue BuSpar  15 mg 3 times a day for worsening anxiety.  -Continue Minipress 2 mg at night for nightmares.  - Patient has failed and tried risperidone, zyprexa, Adderall, citalopram, trazodone, sertraline, amitriptyline, aripiprazole, quetiapine, fluoxetine, and bupropion.      -Encourage patient to keep her therapy appointment to help with her coping skills.    -Encourage compliance with medication as well as getting an alarm to set to help with compliance patient verbalized understanding.      Counseled patient regarding multimodal approach with healthy nutrition, healthy sleep, regular physical activity, social activities, counseling, and medications.      Coping skills reviewed and encouraged positive framing of thoughts     Assisted patient in processing above session content; acknowledged and normalized patient’s thoughts, feelings, and concerns.  Applied  positive coping skills and behavior management in session.  Allowed patient to freely discuss issues without interruption or judgment. Provided safe, confidential environment to facilitate the development of positive therapeutic relationship and encourage open, honest communication. Assisted patient in identifying risk factors which would indicate the need for higher level of care including thoughts to harm self or others and/or self-harming behavior and encouraged patient to contact this office, call 911, or present to the nearest emergency room should any of these events occur. Discussed crisis intervention services and means to access.     MEDS ORDERED DURING VISIT:  No orders of the defined types were placed in this encounter.    90-day supply sent in last month      Follow Up   Return in about 8 weeks (around 4/25/2022), or if symptoms worsen or fail to improve, for Recheck.    Patient was given instructions  and counseling regarding her condition or for health maintenance advice. Please see specific information pulled into the AVS if appropriate.     Some of the data in this electronic note has been brought forward from a previous encounter, any necessary changes have been made, it has been reviewed by this APRN, and it is accurate.      This document has been electronically signed by LESLIE Martin  February 28, 2022 13:17 EST    Part of this note may be an electronic transcription/translation of spoken language to printed text using the Dragon Dictation System.

## 2022-04-14 DIAGNOSIS — F41.1 GENERALIZED ANXIETY DISORDER: ICD-10-CM

## 2022-04-14 DIAGNOSIS — F33.2 SEVERE EPISODE OF RECURRENT MAJOR DEPRESSIVE DISORDER, WITHOUT PSYCHOTIC FEATURES: ICD-10-CM

## 2022-04-14 DIAGNOSIS — F60.3 BORDERLINE PERSONALITY DISORDER: ICD-10-CM

## 2022-04-14 DIAGNOSIS — F43.12 CHRONIC POST-TRAUMATIC STRESS DISORDER (PTSD): ICD-10-CM

## 2022-04-14 RX ORDER — LAMOTRIGINE 100 MG/1
100 TABLET ORAL 2 TIMES DAILY
Qty: 60 TABLET | Refills: 2 | Status: SHIPPED | OUTPATIENT
Start: 2022-04-14 | End: 2022-07-18 | Stop reason: SDUPTHER

## 2022-04-14 RX ORDER — BUSPIRONE HYDROCHLORIDE 15 MG/1
TABLET ORAL
Qty: 90 TABLET | Refills: 2 | Status: SHIPPED | OUTPATIENT
Start: 2022-04-14 | End: 2022-07-18

## 2022-04-25 ENCOUNTER — TELEMEDICINE (OUTPATIENT)
Dept: PSYCHIATRY | Facility: CLINIC | Age: 22
End: 2022-04-25

## 2022-04-25 VITALS — BODY MASS INDEX: 17 KG/M2 | HEIGHT: 65 IN | WEIGHT: 102 LBS

## 2022-04-25 DIAGNOSIS — F33.0 MILD EPISODE OF RECURRENT MAJOR DEPRESSIVE DISORDER: ICD-10-CM

## 2022-04-25 DIAGNOSIS — F41.1 GENERALIZED ANXIETY DISORDER: Primary | ICD-10-CM

## 2022-04-25 DIAGNOSIS — F60.3 BORDERLINE PERSONALITY DISORDER: ICD-10-CM

## 2022-04-25 PROCEDURE — 99214 OFFICE O/P EST MOD 30 MIN: CPT | Performed by: NURSE PRACTITIONER

## 2022-04-25 RX ORDER — TOPIRAMATE 50 MG/1
50 TABLET, FILM COATED ORAL 2 TIMES DAILY
COMMUNITY
End: 2022-05-23

## 2022-04-25 NOTE — PROGRESS NOTES
This provider is located at [facility], [address] [facility] [place] [address].The Patient is seen remotely at [address] [place], [place] [address] [address] via [employer]. Patient is being seen via telehealth and confirm that they are in a secure environment for this session. The patient's condition being diagnosed/treated is appropriate for telemedicine. The provider identified himself/herself: herself as well as her credentials.   The patient gave consent to be seen remotely, and when consent is given they understand that the consent allows for patient identifiable information to be sent to a third party as needed.   They may refuse to be seen remotely at any time. The electronic data is encrypted and password protected, and the patient has been advised of the potential risks to privacy not withstanding such measures.    You have chosen to receive care through a telehealth visit.  Do you consent to use a video/audio connection for your medical care today? Yes    Chief Complaint  Follow-up for mood/anxiety    Subjective    Samantha Dubose presents to [facility] for medication management.     History of Present Illness   Patient presents today noting that she has been doing okay.  She states that she feels as if her mood has been good with no major concerns or issues.  Patient states she has been feeling anxious and worried as she is down to 102 pounds.  Patient denied any binging or purging or restrictive eating or excessive exercise.  She reports that she is trying to eat and drink protein shakes but every time she eats she gets sick and cannot tolerate anymore.  According to the patient from what she believes she recently had an increase in Topamax and has been on recently which may be a contributing factor.  Patient reports that she is sleeping 5 to 6 hours at night.  She denies any major mood changes.  She denies any nightmares in months.  Denies any  "side effects to the medications.  Denies any self-harm.  Admits to previous SI but adamantly denies any plan or intent stating it was just when she got mad at her mom.  Denies any current SI/HI/AH/VH.      Objective   Vital Signs:   Ht 165.1 cm (65\")   Wt 46.3 kg (102 lb)   BMI 16.97 kg/m²   Due to the remote nature of this encounter (virtual encounter), vitals were unable to be obtained.  Height stated at 65 inches.  Weight stated at 102 pounds.      Patient screened positive for depression based on a PHQ-9 score of  on . Follow-up recommendations include: see notes.    PHQ-9 Score:   PHQ-9 Total Score:       Mental Status Exam:   Hygiene:   good  Cooperation:  Cooperative  Eye Contact:  Good  Psychomotor Behavior:  Appropriate  Affect:  Full range  Mood: normal and anxious  Speech:  Normal  Thought Process:  Goal directed and Linear  Thought Content:  Mood congruent  Suicidal:  None   Homicidal:  None  Hallucinations:  None  Delusion:  None  Memory:  Intact  Orientation:  Person, Place, Time and Situation  Reliability:  good  Insight:  Fair  Judgement:  Fair  Impulse Control:  Fair  Physical/Medical Issues:  No      Current Medications:   Current Outpatient Medications   Medication Sig Dispense Refill   • busPIRone (BUSPAR) 15 MG tablet TAKE 1 TABLET 3 TIMES A DAY. 90 tablet 2   • lamoTRIgine (LaMICtal) 100 MG tablet TAKE 1 TABLET BY MOUTH 2 (TWO) TIMES A DAY. 60 tablet 2   • levonorgestrel-ethinyl estradiol (AVIANE,ALESSE,LESSINA) 0.1-20 MG-MCG per tablet Take 1 tablet by mouth Daily.     • topiramate (TOPAMAX) 50 MG tablet Take 50 mg by mouth 2 (Two) Times a Day.       No current facility-administered medications for this visit.       Physical Exam  Nursing note reviewed.   Constitutional:       Appearance: Normal appearance. She is ill-appearing.   Neurological:      Mental Status: She is alert.   Psychiatric:         Attention and Perception: Attention and perception normal.         Mood and Affect: Affect " normal. Mood is anxious. Mood is not depressed. Affect is not angry.         Speech: Speech normal.         Behavior: Behavior is not agitated. Behavior is cooperative.         Thought Content: Thought content normal.         Cognition and Memory: Cognition and memory normal.        Result Review :            Patient refuses all labs.     Assessment and Plan    Problem List Items Addressed This Visit        Mental Health    Generalized anxiety disorder - Primary (Chronic)    Mild episode of recurrent major depressive disorder (HCC)      Other Visit Diagnoses     Borderline personality disorder (HCC)                TREATMENT PLAN/GOALS: Continue supportive psychotherapy efforts and medications as indicated. Treatment and medication options discussed during today's visit. Patient ackowledged and verbally consented to continue with current treatment plan and was educated on the importance of compliance with treatment and follow-up appointments.    MEDICATION ISSUES:  We discussed risks, benefits, and side effects of the above medications and the patient was agreeable with the plan. Patient was educated on the importance of compliance with treatment and follow-up appointments.  Patient is agreeable to call the office with any worsening of symptoms or onset of side effects. Patient is agreeable to call 911 or go to the nearest ER should he/she begin having SI/HI. We will continue Lamictal in an effort to stabilize mood.  The patient was reminded to immediately come to the hospital should there be any loss of control.  Explanation was given to her regarding Lamictal and the potential for Dylan Stoney syndrome and significant rash.  Patient was encouraged to check skin prior to beginning.  Patient was encouraged to report any rash and to immediately stop medication. Patient was strongly encouraged to continue birth control.  Patient was counseled regarding need not to become pregnant prior to discussion and possible  titration and discontinuation of medications.  An explanation was provided to the patient regarding the risk of fetal harm with psychotrophic medications.  Patient was provided education regarding both risk of continuing and discontinuing medications during pregnancy.  Patient verbalized understanding.      -Continue lamotrigine to 100 mg twice daily for mood as well as headaches and seizures. As Dr. Cheng recommended as well.   -Continue BuSpar  15 mg 3 times a day for worsening anxiety.  -Discontinue Minipress since patient has not had a nightmare in several months.  Encourage patient if we need to restart we could do so.  - Patient has failed and tried risperidone, zyprexa, Adderall, citalopram, trazodone, sertraline, amitriptyline, aripiprazole, quetiapine, fluoxetine, and bupropion.  -Patient informed me now that she was just on Topamax and has noticed a significant weight loss and difficulty with eating which could be a potential side effects and encouraged the patient and her mother to contact the neurologist office to discuss this with them.  Also discussed following up with a PCP and referral to a GI specialist if not due to a side effect from the Topamax.  Patient and mother verbalized understanding.    -Encourage patient to keep her therapy appointment to help with her coping skills.    -Encourage compliance with medication as well as getting an alarm to set to help with compliance patient verbalized understanding.      Counseled patient regarding multimodal approach with healthy nutrition, healthy sleep, regular physical activity, social activities, counseling, and medications.      Coping skills reviewed and encouraged positive framing of thoughts     Assisted patient in processing above session content; acknowledged and normalized patient’s thoughts, feelings, and concerns.  Applied  positive coping skills and behavior management in session.  Allowed patient to freely discuss issues without interruption  or judgment. Provided safe, confidential environment to facilitate the development of positive therapeutic relationship and encourage open, honest communication. Assisted patient in identifying risk factors which would indicate the need for higher level of care including thoughts to harm self or others and/or self-harming behavior and encouraged patient to contact this office, call 911, or present to the nearest emergency room should any of these events occur. Discussed crisis intervention services and means to access.     MEDS ORDERED DURING VISIT:  No orders of the defined types were placed in this encounter.    90-day supply sent in last month      Follow Up   Return in about 4 weeks (around 5/23/2022), or if symptoms worsen or fail to improve, for Recheck.    Patient was given instructions and counseling regarding her condition or for health maintenance advice. Please see specific information pulled into the AVS if appropriate.     Some of the data in this electronic note has been brought forward from a previous encounter, any necessary changes have been made, it has been reviewed by this APRN, and it is accurate.      This document has been electronically signed by LESLIE Martin  April 25, 2022 13:24 EDT    Part of this note may be an electronic transcription/translation of spoken language to printed text using the Dragon Dictation System.

## 2022-05-23 ENCOUNTER — TELEMEDICINE (OUTPATIENT)
Dept: PSYCHIATRY | Facility: CLINIC | Age: 22
End: 2022-05-23

## 2022-05-23 DIAGNOSIS — F41.1 GENERALIZED ANXIETY DISORDER: ICD-10-CM

## 2022-05-23 DIAGNOSIS — F60.3 BORDERLINE PERSONALITY DISORDER: Primary | ICD-10-CM

## 2022-05-23 DIAGNOSIS — F33.0 MILD EPISODE OF RECURRENT MAJOR DEPRESSIVE DISORDER: ICD-10-CM

## 2022-05-23 PROCEDURE — 99214 OFFICE O/P EST MOD 30 MIN: CPT | Performed by: NURSE PRACTITIONER

## 2022-05-23 NOTE — PROGRESS NOTES
This provider is located at [facility], [address] [place] [address].The Patient is seen remotely at [address] [place], [place] [address] [address] via [employer]. Patient is being seen via telehealth and confirm that they are in a secure environment for this session. The patient's condition being diagnosed/treated is appropriate for telemedicine. The provider identified himself/herself: herself as well as her credentials.   The patient gave consent to be seen remotely, and when consent is given they understand that the consent allows for patient identifiable information to be sent to a third party as needed.   They may refuse to be seen remotely at any time. The electronic data is encrypted and password protected, and the patient has been advised of the potential risks to privacy not withstanding such measures.    You have chosen to receive care through a telehealth visit.  Do you consent to use a video/audio connection for your medical care today? Yes    Chief Complaint  Follow-up for mood/anxiety    Subjective    Samantha Dubose presents to [facility] for medication management.     History of Present Illness   Patient presents today with her mother noting that she did stop the Topamax and then she started eating much better.  She states that she has done better being off the Topamax with her appetite but notes that she went from 99 pounds to 104 pounds despite eating well has went back down to 102 pounds.  Patient notes now she is sleeping more than usual.  She states work has been busy as she has been working 10-hour shifts at a time.  She denies any cutting.  Mother states that she has been working well with the public.  They note that she did have a bad mood several weeks ago but that could have been because of her boyfriend and working 10-hour shifts almost every day.  Patient denies any suicidal thoughts that she states she is not been  depressed just irritable at times.  Denies any SI/HI/AH/VH.  Denies any other concerns or issues.      Objective   Vital Signs:   There were no vitals taken for this visit.  Due to the remote nature of this encounter (virtual encounter), vitals were unable to be obtained.  Height stated at 65 inches.  Weight stated at 102 pounds.      Patient screened positive for depression based on a PHQ-9 score of  on . Follow-up recommendations include: see notes.    PHQ-9 Score:   PHQ-9 Total Score:       Mental Status Exam:   Hygiene:   good  Cooperation:  Cooperative  Eye Contact:  Good  Psychomotor Behavior:  Appropriate  Affect:  Full range  Mood: normal and irritable  Speech:  Normal  Thought Process:  Goal directed and Linear  Thought Content:  Mood congruent  Suicidal:  None   Homicidal:  None  Hallucinations:  None  Delusion:  None  Memory:  Intact  Orientation:  Person, Place, Time and Situation  Reliability:  good  Insight:  Fair  Judgement:  Fair  Impulse Control:  Fair  Physical/Medical Issues:  No      Current Medications:   Current Outpatient Medications   Medication Sig Dispense Refill   • busPIRone (BUSPAR) 15 MG tablet TAKE 1 TABLET 3 TIMES A DAY. 90 tablet 2   • lamoTRIgine (LaMICtal) 100 MG tablet TAKE 1 TABLET BY MOUTH 2 (TWO) TIMES A DAY. 60 tablet 2   • levonorgestrel-ethinyl estradiol (AVIANE,ALESSE,LESSINA) 0.1-20 MG-MCG per tablet Take 1 tablet by mouth Daily.       No current facility-administered medications for this visit.       Physical Exam  Nursing note reviewed.   Constitutional:       Appearance: Normal appearance. She is not ill-appearing.   Neurological:      Mental Status: She is alert.   Psychiatric:         Attention and Perception: Attention and perception normal.         Mood and Affect: Mood and affect normal. Mood is not anxious or depressed. Affect is not angry.         Speech: Speech normal.         Behavior: Behavior is not agitated. Behavior is cooperative.         Thought Content:  Thought content normal.         Cognition and Memory: Cognition and memory normal.        Result Review :            Patient refuses all labs.     Assessment and Plan    Problem List Items Addressed This Visit        Mental Health    Generalized anxiety disorder (Chronic)    Mild episode of recurrent major depressive disorder (HCC)      Other Visit Diagnoses     Borderline personality disorder (HCC)    -  Primary            TREATMENT PLAN/GOALS: Continue supportive psychotherapy efforts and medications as indicated. Treatment and medication options discussed during today's visit. Patient ackowledged and verbally consented to continue with current treatment plan and was educated on the importance of compliance with treatment and follow-up appointments.    MEDICATION ISSUES:  We discussed risks, benefits, and side effects of the above medications and the patient was agreeable with the plan. Patient was educated on the importance of compliance with treatment and follow-up appointments.  Patient is agreeable to call the office with any worsening of symptoms or onset of side effects. Patient is agreeable to call 911 or go to the nearest ER should he/she begin having SI/HI. We will continue Lamictal in an effort to stabilize mood.  The patient was reminded to immediately come to the hospital should there be any loss of control.  Explanation was given to her regarding Lamictal and the potential for Dylan Stoney syndrome and significant rash.  Patient was encouraged to check skin prior to beginning.  Patient was encouraged to report any rash and to immediately stop medication. Patient was strongly encouraged to continue birth control.  Patient was counseled regarding need not to become pregnant prior to discussion and possible titration and discontinuation of medications.  An explanation was provided to the patient regarding the risk of fetal harm with psychotrophic medications.  Patient was provided education regarding  both risk of continuing and discontinuing medications during pregnancy.  Patient verbalized understanding.      -Continue lamotrigine to 100 mg twice daily for mood as well as headaches and seizures. As Dr. Cheng recommended as well.   -Continue BuSpar  15 mg 3 times a day for worsening anxiety.  - Patient has failed and tried risperidone, zyprexa, Adderall, citalopram, trazodone, sertraline, amitriptyline, aripiprazole, quetiapine, fluoxetine, and bupropion  -Encourage patient to keep her therapy appointment to help with her coping skills.    -Due to patient's weight loss as well as sleep changes highly encouraged her to go to a PCP visit and check her thyroid as well as other labs patient was agreeable as well as mother stating that they would go to the [facility] in [place].      Counseled patient regarding multimodal approach with healthy nutrition, healthy sleep, regular physical activity, social activities, counseling, and medications.      Coping skills reviewed and encouraged positive framing of thoughts     Assisted patient in processing above session content; acknowledged and normalized patient’s thoughts, feelings, and concerns.  Applied  positive coping skills and behavior management in session.  Allowed patient to freely discuss issues without interruption or judgment. Provided safe, confidential environment to facilitate the development of positive therapeutic relationship and encourage open, honest communication. Assisted patient in identifying risk factors which would indicate the need for higher level of care including thoughts to harm self or others and/or self-harming behavior and encouraged patient to contact this office, call 911, or present to the nearest emergency room should any of these events occur. Discussed crisis intervention services and means to access.     MEDS ORDERED DURING VISIT:  No orders of the defined types were placed in this encounter.        Follow Up   Return in about 8  weeks (around 7/18/2022), or if symptoms worsen or fail to improve, for Recheck.    Patient was given instructions and counseling regarding her condition or for health maintenance advice. Please see specific information pulled into the AVS if appropriate.     Some of the data in this electronic note has been brought forward from a previous encounter, any necessary changes have been made, it has been reviewed by this APRN, and it is accurate.      This document has been electronically signed by LESLIE Martin  May 23, 2022 13:32 EDT    Part of this note may be an electronic transcription/translation of spoken language to printed text using the Dragon Dictation System.

## 2022-06-06 ENCOUNTER — OFFICE VISIT (OUTPATIENT)
Dept: FAMILY MEDICINE CLINIC | Facility: CLINIC | Age: 22
End: 2022-06-06

## 2022-06-06 VITALS
WEIGHT: 108.2 LBS | RESPIRATION RATE: 18 BRPM | TEMPERATURE: 97.5 F | OXYGEN SATURATION: 100 % | DIASTOLIC BLOOD PRESSURE: 60 MMHG | SYSTOLIC BLOOD PRESSURE: 104 MMHG | HEIGHT: 65 IN | HEART RATE: 65 BPM | BODY MASS INDEX: 18.03 KG/M2

## 2022-06-06 DIAGNOSIS — Z01.89 ENCOUNTER FOR LABORATORY TEST: ICD-10-CM

## 2022-06-06 DIAGNOSIS — F33.0 MILD EPISODE OF RECURRENT MAJOR DEPRESSIVE DISORDER: ICD-10-CM

## 2022-06-06 DIAGNOSIS — Z00.00 ENCOUNTER FOR MEDICAL EXAMINATION TO ESTABLISH CARE: Primary | ICD-10-CM

## 2022-06-06 DIAGNOSIS — Z00.00 ANNUAL PHYSICAL EXAM: ICD-10-CM

## 2022-06-06 DIAGNOSIS — F41.1 GENERALIZED ANXIETY DISORDER: ICD-10-CM

## 2022-06-06 PROBLEM — G43.109 MIGRAINE WITH AURA AND WITHOUT STATUS MIGRAINOSUS, NOT INTRACTABLE: Status: ACTIVE | Noted: 2022-06-06

## 2022-06-06 PROBLEM — Z30.02: Status: ACTIVE | Noted: 2022-06-06

## 2022-06-06 PROBLEM — R56.9 SEIZURE: Status: ACTIVE | Noted: 2022-06-06

## 2022-06-06 LAB
ALBUMIN SERPL-MCNC: 4.6 G/DL (ref 3.5–5.2)
ALBUMIN/GLOB SERPL: 1.6 G/DL
ALP SERPL-CCNC: 46 U/L (ref 39–117)
ALT SERPL W P-5'-P-CCNC: 12 U/L (ref 1–33)
ANION GAP SERPL CALCULATED.3IONS-SCNC: 11 MMOL/L (ref 5–15)
AST SERPL-CCNC: 13 U/L (ref 1–32)
BILIRUB BLD-MCNC: NEGATIVE MG/DL
BILIRUB SERPL-MCNC: 0.3 MG/DL (ref 0–1.2)
BUN SERPL-MCNC: 16 MG/DL (ref 6–20)
BUN/CREAT SERPL: 24.2 (ref 7–25)
CALCIUM SPEC-SCNC: 9.5 MG/DL (ref 8.6–10.5)
CHLORIDE SERPL-SCNC: 106 MMOL/L (ref 98–107)
CHOLEST SERPL-MCNC: 130 MG/DL (ref 0–200)
CLARITY, POC: CLEAR
CO2 SERPL-SCNC: 22 MMOL/L (ref 22–29)
COLOR UR: YELLOW
CREAT SERPL-MCNC: 0.66 MG/DL (ref 0.57–1)
EGFRCR SERPLBLD CKD-EPI 2021: 127.4 ML/MIN/1.73
GLOBULIN UR ELPH-MCNC: 2.9 GM/DL
GLUCOSE SERPL-MCNC: 85 MG/DL (ref 65–99)
GLUCOSE UR STRIP-MCNC: NEGATIVE MG/DL
HCV AB SER DONR QL: NORMAL
HDLC SERPL-MCNC: 62 MG/DL (ref 40–60)
KETONES UR QL: NEGATIVE
LDLC SERPL CALC-MCNC: 57 MG/DL (ref 0–100)
LDLC/HDLC SERPL: 0.95 {RATIO}
LEUKOCYTE EST, POC: NEGATIVE
NITRITE UR-MCNC: NEGATIVE MG/ML
PH UR: 6 [PH] (ref 5–8)
POTASSIUM SERPL-SCNC: 4.2 MMOL/L (ref 3.5–5.2)
PROT SERPL-MCNC: 7.5 G/DL (ref 6–8.5)
PROT UR STRIP-MCNC: NEGATIVE MG/DL
RBC # UR STRIP: NEGATIVE /UL
SODIUM SERPL-SCNC: 139 MMOL/L (ref 136–145)
SP GR UR: 1.03 (ref 1–1.03)
TRIGL SERPL-MCNC: 46 MG/DL (ref 0–150)
TSH SERPL DL<=0.05 MIU/L-ACNC: 0.33 UIU/ML (ref 0.27–4.2)
UROBILINOGEN UR QL: NORMAL
VLDLC SERPL-MCNC: 11 MG/DL (ref 5–40)

## 2022-06-06 PROCEDURE — 82306 VITAMIN D 25 HYDROXY: CPT | Performed by: PSYCHOLOGIST

## 2022-06-06 PROCEDURE — 82607 VITAMIN B-12: CPT | Performed by: PSYCHOLOGIST

## 2022-06-06 PROCEDURE — 3008F BODY MASS INDEX DOCD: CPT | Performed by: PSYCHOLOGIST

## 2022-06-06 PROCEDURE — 84443 ASSAY THYROID STIM HORMONE: CPT | Performed by: PSYCHOLOGIST

## 2022-06-06 PROCEDURE — 99385 PREV VISIT NEW AGE 18-39: CPT | Performed by: PSYCHOLOGIST

## 2022-06-06 PROCEDURE — 80061 LIPID PANEL: CPT | Performed by: PSYCHOLOGIST

## 2022-06-06 PROCEDURE — 80053 COMPREHEN METABOLIC PANEL: CPT | Performed by: PSYCHOLOGIST

## 2022-06-06 PROCEDURE — 2014F MENTAL STATUS ASSESS: CPT | Performed by: PSYCHOLOGIST

## 2022-06-06 PROCEDURE — 81002 URINALYSIS NONAUTO W/O SCOPE: CPT | Performed by: PSYCHOLOGIST

## 2022-06-06 PROCEDURE — 86803 HEPATITIS C AB TEST: CPT | Performed by: PSYCHOLOGIST

## 2022-06-06 RX ORDER — RIZATRIPTAN BENZOATE 10 MG/1
TABLET, ORALLY DISINTEGRATING ORAL
COMMUNITY
Start: 2022-04-14
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
## 2022-06-07 LAB
25(OH)D3 SERPL-MCNC: 58.5 NG/ML (ref 30–100)
VIT B12 BLD-MCNC: 380 PG/ML (ref 211–946)

## 2022-06-07 NOTE — PROGRESS NOTES
Patient called this morning guarding lab results but unable to leave a voice message because her mailbox was full.  We will try again and also we will try her guardian which is her mother.

## 2022-06-08 ENCOUNTER — TELEPHONE (OUTPATIENT)
Dept: FAMILY MEDICINE CLINIC | Facility: CLINIC | Age: 22
End: 2022-06-08

## 2022-06-08 NOTE — TELEPHONE ENCOUNTER
At 12:23pm on 6/8/22 I tried to reach this patient to let her know about the lab collection issue. We need to redraw some of her labs from 6/6/22. Per the Verbal Release from we can leave voicemail with her mobile contact number. I was not able to leave a voicemail, the message said the voicemail was full and not accepting new messages. We will send her a My Chart Message requesting her to contact our office.

## 2022-06-09 ENCOUNTER — TELEPHONE (OUTPATIENT)
Dept: FAMILY MEDICINE CLINIC | Facility: CLINIC | Age: 22
End: 2022-06-09

## 2022-06-09 ENCOUNTER — PATIENT ROUNDING (BHMG ONLY) (OUTPATIENT)
Dept: FAMILY MEDICINE CLINIC | Facility: CLINIC | Age: 22
End: 2022-06-09

## 2022-06-09 NOTE — TELEPHONE ENCOUNTER
CALLED PATIENT'S GUARDIAN and advised her regarding lab results for her daughter Samantha.  Mom reassured and will just repeat lipid panel in 3 months and all the others are good to continue lifestyle modification.

## 2022-06-09 NOTE — TELEPHONE ENCOUNTER
ON 6/9/22 I tried to reach out to this patient for patient rounding and to schedule a lab appointment. We have tried to call and send My Chart messages to this patient to have her come for a redraw on labs. I called the home contact number and the patients mom answered. The patient refused to come to the phone. The patients mom is on the release of information form. I informed patients mom about the labs that could not be resulted. And ask her to tell the patient that we need her to stop in and have the labs collected.

## 2022-06-09 NOTE — PROGRESS NOTES
June 9, 2022    Hello, may I speak with Samantha Dubose?    My name is Cali White    I am  with [facility]  [address] [address] [phone].    Before we get started may I verify your date of birth? 2000    I am calling to officially welcome you to our practice and ask about your recent visit. Is this a good time to talk?     NO I tried to reach this patient on the mobile number listed. I was not able to contact her. I then tried her home phone. He mom answered( I just finsihed patient rounding with her) she tried to get Samantha to take the call, She just said the visit was fine. Samantha's mom is on her release of information as I ask mom to let Samantha know we have tried to contact her about lab redraw we need to complete. The patient was told to call to schedule a lab appointment or stop by to have the labs collected.

## 2022-07-18 ENCOUNTER — TELEMEDICINE (OUTPATIENT)
Dept: PSYCHIATRY | Facility: CLINIC | Age: 22
End: 2022-07-18

## 2022-07-18 DIAGNOSIS — F60.3 BORDERLINE PERSONALITY DISORDER: ICD-10-CM

## 2022-07-18 DIAGNOSIS — F43.12 CHRONIC POST-TRAUMATIC STRESS DISORDER (PTSD): ICD-10-CM

## 2022-07-18 DIAGNOSIS — F41.1 GENERALIZED ANXIETY DISORDER: ICD-10-CM

## 2022-07-18 DIAGNOSIS — F33.1 MODERATE EPISODE OF RECURRENT MAJOR DEPRESSIVE DISORDER: ICD-10-CM

## 2022-07-18 PROCEDURE — 99214 OFFICE O/P EST MOD 30 MIN: CPT | Performed by: NURSE PRACTITIONER

## 2022-07-18 RX ORDER — LAMOTRIGINE 100 MG/1
100 TABLET ORAL 2 TIMES DAILY
Qty: 60 TABLET | Refills: 2 | Status: SHIPPED | OUTPATIENT
Start: 2022-07-18 | End: 2022-08-16 | Stop reason: SDUPTHER

## 2022-07-18 RX ORDER — BUSPIRONE HYDROCHLORIDE 5 MG/1
5 TABLET ORAL 2 TIMES DAILY
Qty: 14 TABLET | Refills: 0 | Status: SHIPPED | OUTPATIENT
Start: 2022-07-18 | End: 2022-08-16
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
## 2022-08-16 ENCOUNTER — TELEMEDICINE (OUTPATIENT)
Dept: PSYCHIATRY | Facility: CLINIC | Age: 22
End: 2022-08-16

## 2022-08-16 DIAGNOSIS — F33.0 MILD EPISODE OF RECURRENT MAJOR DEPRESSIVE DISORDER: ICD-10-CM

## 2022-08-16 DIAGNOSIS — F60.3 BORDERLINE PERSONALITY DISORDER: Primary | ICD-10-CM

## 2022-08-16 DIAGNOSIS — F41.1 GENERALIZED ANXIETY DISORDER: ICD-10-CM

## 2022-08-16 PROCEDURE — 99214 OFFICE O/P EST MOD 30 MIN: CPT | Performed by: NURSE PRACTITIONER

## 2022-08-16 RX ORDER — LAMOTRIGINE 100 MG/1
100 TABLET ORAL 2 TIMES DAILY
Qty: 60 TABLET | Refills: 2 | Status: SHIPPED | OUTPATIENT
Start: 2022-08-16 | End: 2022-11-21

## 2022-08-16 NOTE — PROGRESS NOTES
This provider is located at [facility] [address] [facility] [place] [address].The Patient is seen remotely at [address] [place], [place] [address] [address] via [employer]. Patient is being seen via telehealth and confirm that they are in a secure environment for this session. The patient's condition being diagnosed/treated is appropriate for telemedicine. The provider identified himself/herself: herself as well as her credentials.   The patient gave consent to be seen remotely, and when consent is given they understand that the consent allows for patient identifiable information to be sent to a third party as needed.   They may refuse to be seen remotely at any time. The electronic data is encrypted and password protected, and the patient has been advised of the potential risks to privacy not withstanding such measures.    You have chosen to receive care through a telehealth visit.  Do you consent to use a video/audio connection for your medical care today? Yes    Chief Complaint  Follow-up for mood/anxiety    Subjective    Samantha Dubose presents to [facility] for medication management.     History of Present Illness   Patient presents today reporting that she has been doing good.  She states that she is still at her biological father's house and doing well as her brother Dylan is present.  She states that she is working on her [employer] and currently obtained her multiplication tables and now working on division.  Patient states that she did well coming off the BuSpar and been less tired.  Reports that she is eating and sleeping well.  Denies any self-harm.  She states that she is doing well on the lamotrigine as her mood has been stabilized and denies any major depressive symptoms or hopelessness or helplessness.  She reports that she has felt slightly depressed at times as she called her mother and talked with her.  She states her mother also refused to give  her social security checks that she has an appointment at the office of dealing with her has been upsetting.  Patient states otherwise she is doing well.  Patient reports that she has not been taking birth control as she ran out and has to go to the health department encouraged her to do so due to the medication she is on.  Denied any side effects or rash.  Denies any SI/HI/AH/VH.      Objective   Vital Signs:   There were no vitals taken for this visit.  Due to the remote nature of this encounter (virtual encounter), vitals were unable to be obtained.  Height stated at 65 inches.  Weight stated at 108 pounds.      Patient screened positive for depression based on a PHQ-9 score of  on . Follow-up recommendations include: see notes.    PHQ-9 Score:   PHQ-9 Total Score:       Mental Status Exam:   Hygiene:   good  Cooperation:  Cooperative  Eye Contact:  Fair  Psychomotor Behavior:  Appropriate  Affect:  Full range  Mood: normal  Speech:  Normal  Thought Process:  Goal directed and Linear  Thought Content:  Mood congruent  Suicidal:  None   Homicidal:  None  Hallucinations:  None  Delusion:  None  Memory:  Intact  Orientation:  Person, Place, Time and Situation  Reliability:  good  Insight:  Fair  Judgement:  Fair  Impulse Control:  Fair  Physical/Medical Issues:  No      Current Medications:   Current Outpatient Medications   Medication Sig Dispense Refill   • lamoTRIgine (LaMICtal) 100 MG tablet Take 1 tablet by mouth 2 (Two) Times a Day. 60 tablet 2   • rizatriptan MLT (MAXALT-MLT) 10 MG disintegrating tablet TAKE 1 TABLET FOR HEADACHE ONSET MAX OF 2 A DAY     • levonorgestrel-ethinyl estradiol (AVIANE,ALESSE,LESSINA) 0.1-20 MG-MCG per tablet Take 1 tablet by mouth Daily.       No current facility-administered medications for this visit.       Physical Exam  Nursing note reviewed.   Constitutional:       Appearance: Normal appearance. She is not ill-appearing.   Neurological:      Mental Status: She is alert.    Psychiatric:         Attention and Perception: Attention and perception normal.         Mood and Affect: Mood and affect normal. Mood is not anxious or depressed. Affect is not angry.         Speech: Speech normal.         Behavior: Behavior is not agitated. Behavior is cooperative.         Thought Content: Thought content normal.         Cognition and Memory: Cognition and memory normal.        Result Review :            Patient refuses all labs.     Assessment and Plan    Problem List Items Addressed This Visit        Mental Health    Generalized anxiety disorder (Chronic)    Relevant Medications    lamoTRIgine (LaMICtal) 100 MG tablet    Mild episode of recurrent major depressive disorder (HCC)      Other Visit Diagnoses     Borderline personality disorder (HCC)    -  Primary    Relevant Medications    lamoTRIgine (LaMICtal) 100 MG tablet            TREATMENT PLAN/GOALS: Continue supportive psychotherapy efforts and medications as indicated. Treatment and medication options discussed during today's visit. Patient ackowledged and verbally consented to continue with current treatment plan and was educated on the importance of compliance with treatment and follow-up appointments.    MEDICATION ISSUES:  We discussed risks, benefits, and side effects of the above medications and the patient was agreeable with the plan. Patient was educated on the importance of compliance with treatment and follow-up appointments.  Patient is agreeable to call the office with any worsening of symptoms or onset of side effects. Patient is agreeable to call 911 or go to the nearest ER should he/she begin having SI/HI. We will continue Lamictal in an effort to stabilize mood.  The patient was reminded to immediately come to the hospital should there be any loss of control.  Explanation was given to her regarding Lamictal and the potential for Dylan Stoney syndrome and significant rash.  Patient was encouraged to check skin prior to  beginning.  Patient was encouraged to report any rash and to immediately stop medication. Patient was strongly encouraged to continue birth control.  Patient was counseled regarding need not to become pregnant prior to discussion and possible titration and discontinuation of medications.  An explanation was provided to the patient regarding the risk of fetal harm with psychotrophic medications.  Patient was provided education regarding both risk of continuing and discontinuing medications during pregnancy.  Patient verbalized understanding.      -Continue lamotrigine to 100 mg twice daily for mood as well as headaches and seizures. As Dr. Cheng recommended as well.   - Patient has failed and tried risperidone, zyprexa, Adderall, citalopram, trazodone, sertraline, amitriptyline, aripiprazole, quetiapine, fluoxetine, and bupropion  -Schedule for psychotherapy.    Counseled patient regarding multimodal approach with healthy nutrition, healthy sleep, regular physical activity, social activities, counseling, and medications.      Coping skills reviewed and encouraged positive framing of thoughts     Assisted patient in processing above session content; acknowledged and normalized patient’s thoughts, feelings, and concerns.  Applied  positive coping skills and behavior management in session.  Allowed patient to freely discuss issues without interruption or judgment. Provided safe, confidential environment to facilitate the development of positive therapeutic relationship and encourage open, honest communication. Assisted patient in identifying risk factors which would indicate the need for higher level of care including thoughts to harm self or others and/or self-harming behavior and encouraged patient to contact this office, call 911, or present to the nearest emergency room should any of these events occur. Discussed crisis intervention services and means to access.     MEDS ORDERED DURING VISIT:  New Medications Ordered  This Visit   Medications   • lamoTRIgine (LaMICtal) 100 MG tablet     Sig: Take 1 tablet by mouth 2 (Two) Times a Day.     Dispense:  60 tablet     Refill:  2         Follow Up   Return in about 8 weeks (around 10/11/2022), or if symptoms worsen or fail to improve, for Recheck.    Patient was given instructions and counseling regarding her condition or for health maintenance advice. Please see specific information pulled into the AVS if appropriate.     Some of the data in this electronic note has been brought forward from a previous encounter, any necessary changes have been made, it has been reviewed by this APRN, and it is accurate.      This document has been electronically signed by LESLIE Martin  August 16, 2022 13:41 EDT    Part of this note may be an electronic transcription/translation of spoken language to printed text using the Dragon Dictation System.

## 2022-11-19 DIAGNOSIS — F60.3 BORDERLINE PERSONALITY DISORDER: ICD-10-CM

## 2022-11-19 DIAGNOSIS — F41.1 GENERALIZED ANXIETY DISORDER: ICD-10-CM

## 2022-11-21 RX ORDER — LAMOTRIGINE 100 MG/1
TABLET ORAL
Qty: 60 TABLET | Refills: 2 | Status: SHIPPED | OUTPATIENT
Start: 2022-11-21 | End: 2022-12-06

## 2022-11-27 ENCOUNTER — HOSPITAL ENCOUNTER (EMERGENCY)
Facility: HOSPITAL | Age: 22
Discharge: LEFT BEFORE DISCHARGE | End: 2022-11-27
Admitting: STUDENT IN AN ORGANIZED HEALTH CARE EDUCATION/TRAINING PROGRAM

## 2022-11-27 VITALS
RESPIRATION RATE: 16 BRPM | SYSTOLIC BLOOD PRESSURE: 116 MMHG | OXYGEN SATURATION: 99 % | HEIGHT: 65 IN | BODY MASS INDEX: 19.99 KG/M2 | TEMPERATURE: 97.8 F | WEIGHT: 120 LBS | HEART RATE: 77 BPM | DIASTOLIC BLOOD PRESSURE: 73 MMHG

## 2022-11-27 PROCEDURE — 99282 EMERGENCY DEPT VISIT SF MDM: CPT

## 2022-12-06 ENCOUNTER — TELEMEDICINE (OUTPATIENT)
Dept: PSYCHIATRY | Facility: CLINIC | Age: 22
End: 2022-12-06

## 2022-12-06 DIAGNOSIS — F41.1 GENERALIZED ANXIETY DISORDER: ICD-10-CM

## 2022-12-06 DIAGNOSIS — F60.3 BORDERLINE PERSONALITY DISORDER: Primary | ICD-10-CM

## 2022-12-06 DIAGNOSIS — F33.1 MODERATE EPISODE OF RECURRENT MAJOR DEPRESSIVE DISORDER: ICD-10-CM

## 2022-12-06 PROCEDURE — 99214 OFFICE O/P EST MOD 30 MIN: CPT | Performed by: NURSE PRACTITIONER

## 2022-12-06 RX ORDER — LAMOTRIGINE 25 MG/1
TABLET ORAL
Qty: 45 TABLET | Refills: 0 | Status: SHIPPED | OUTPATIENT
Start: 2022-12-06 | End: 2023-01-04
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
## 2023-01-04 ENCOUNTER — TELEMEDICINE (OUTPATIENT)
Dept: PSYCHIATRY | Facility: CLINIC | Age: 23
End: 2023-01-04
Payer: COMMERCIAL

## 2023-01-04 DIAGNOSIS — F41.1 GENERALIZED ANXIETY DISORDER: ICD-10-CM

## 2023-01-04 DIAGNOSIS — F33.1 MODERATE EPISODE OF RECURRENT MAJOR DEPRESSIVE DISORDER: ICD-10-CM

## 2023-01-04 DIAGNOSIS — F60.3 BORDERLINE PERSONALITY DISORDER: Primary | ICD-10-CM

## 2023-01-04 PROCEDURE — 99214 OFFICE O/P EST MOD 30 MIN: CPT | Performed by: NURSE PRACTITIONER

## 2023-01-04 PROCEDURE — 1160F RVW MEDS BY RX/DR IN RCRD: CPT | Performed by: NURSE PRACTITIONER

## 2023-01-04 PROCEDURE — 1159F MED LIST DOCD IN RCRD: CPT | Performed by: NURSE PRACTITIONER
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
## 2023-02-14 ENCOUNTER — OFFICE VISIT (OUTPATIENT)
Dept: FAMILY MEDICINE CLINIC | Facility: CLINIC | Age: 23
End: 2023-02-14
Payer: COMMERCIAL

## 2023-02-14 VITALS
TEMPERATURE: 97.7 F | OXYGEN SATURATION: 99 % | RESPIRATION RATE: 18 BRPM | SYSTOLIC BLOOD PRESSURE: 90 MMHG | HEART RATE: 71 BPM | DIASTOLIC BLOOD PRESSURE: 58 MMHG

## 2023-02-14 DIAGNOSIS — R05.9 COUGH, UNSPECIFIED TYPE: Primary | ICD-10-CM

## 2023-02-14 DIAGNOSIS — R06.02 SHORTNESS OF BREATH: ICD-10-CM

## 2023-02-14 DIAGNOSIS — J02.9 SORE THROAT: ICD-10-CM

## 2023-02-14 LAB
EXPIRATION DATE: NORMAL
EXPIRATION DATE: NORMAL
FLUAV AG UPPER RESP QL IA.RAPID: NOT DETECTED
FLUBV AG UPPER RESP QL IA.RAPID: NOT DETECTED
INTERNAL CONTROL: NORMAL
INTERNAL CONTROL: NORMAL
Lab: NORMAL
Lab: NORMAL
S PYO AG THROAT QL: NEGATIVE
SARS-COV-2 AG UPPER RESP QL IA.RAPID: NOT DETECTED

## 2023-02-14 PROCEDURE — 87880 STREP A ASSAY W/OPTIC: CPT | Performed by: NURSE PRACTITIONER

## 2023-02-14 PROCEDURE — 99213 OFFICE O/P EST LOW 20 MIN: CPT | Performed by: NURSE PRACTITIONER

## 2023-02-14 PROCEDURE — 87428 SARSCOV & INF VIR A&B AG IA: CPT | Performed by: NURSE PRACTITIONER

## 2023-02-14 NOTE — PROGRESS NOTES
Chief Complaint  Fever, Cough, and Shortness of Breath    Patient is here for an urgent care/acute visit.  Patient has an established, non-[facility] Primary Care Provider.     Minerva Dubose presents to [facility] for acute care (uri; cough).    URI   This is a new problem. The current episode started yesterday. The problem has been gradually worsening. Maximum temperature: subjective fever per pt. Associated symptoms include congestion, coughing, diarrhea, headaches, nausea, rhinorrhea, sinus pain, sneezing and a sore throat. Pertinent negatives include no abdominal pain, chest pain, dysuria, ear pain, joint pain, joint swelling, neck pain, plugged ear sensation, rash, swollen glands, vomiting or wheezing. She has tried nothing for the symptoms.   Cough  This is a new problem. The current episode started yesterday. The problem has been gradually worsening. The problem occurs every few minutes. The cough is non-productive. Associated symptoms include chills, a fever, headaches, rhinorrhea, a sore throat and shortness of breath. Pertinent negatives include no chest pain, ear congestion, ear pain, heartburn, hemoptysis, myalgias, nasal congestion, postnasal drip, rash, sweats, weight loss or wheezing. The symptoms are aggravated by pollens. She has tried nothing for the symptoms. Her past medical history is significant for environmental allergies.     Objective   Vital Signs:  BP 90/58 (BP Location: Right arm, Patient Position: Sitting, Cuff Size: Adult)   Pulse 71   Temp 97.7 °F (36.5 °C)   Resp 18   SpO2 99%     BMI:   BMI is within normal parameters. No other follow-up for BMI required.      Physical Exam  Vitals and nursing note reviewed.   Constitutional:       General: She is awake.      Appearance: Normal appearance.   HENT:      Head: Normocephalic.      Right Ear: Hearing, tympanic membrane, ear canal and external ear normal.      Left Ear: Hearing, tympanic  membrane, ear canal and external ear normal.      Nose: Rhinorrhea present. Rhinorrhea is clear.      Mouth/Throat:      Lips: Pink.      Mouth: Mucous membranes are moist.      Pharynx: Oropharynx is clear. No pharyngeal swelling or posterior oropharyngeal erythema.   Eyes:      General: Lids are normal.      Conjunctiva/sclera: Conjunctivae normal.      Pupils: Pupils are equal, round, and reactive to light.   Cardiovascular:      Rate and Rhythm: Normal rate and regular rhythm.      Heart sounds: Normal heart sounds.   Pulmonary:      Effort: Pulmonary effort is normal. No respiratory distress.      Breath sounds: Normal breath sounds. No decreased air movement.   Abdominal:      General: Abdomen is protuberant. Bowel sounds are normal.      Palpations: Abdomen is soft.      Tenderness: There is no abdominal tenderness.   Musculoskeletal:         General: Normal range of motion.      Cervical back: Normal range of motion and neck supple.   Lymphadenopathy:      Cervical: No cervical adenopathy.   Skin:     General: Skin is warm and dry.      Capillary Refill: Capillary refill takes less than 2 seconds.   Neurological:      Mental Status: She is alert and oriented to person, place, and time.      Sensory: Sensation is intact.      Motor: Motor function is intact.      Coordination: Coordination is intact.      Gait: Gait is intact.   Psychiatric:         Attention and Perception: Attention and perception normal.         Mood and Affect: Mood is anxious.         Speech: Speech is rapid and pressured.         Behavior: Behavior is agitated. Behavior is cooperative.         Thought Content: Thought content normal.          Result Review :   The following data was reviewed by: LESLIE Hutson on 02/14/2023:  Strep    Common Labsle 2/14/23   POC Strep A, Molecular Negative           SARS FLU A / B Point of Care test.    Influenza A - Negative (-)  Influenza B - Negative (-)  SARS COVID Ag - Negative (-)      Assessment and Plan    Diagnoses and all orders for this visit:    1. Cough, unspecified type (Primary)  Comments:  Symptomatic/Supportive care; pt encouraged to contact OB/GYN for updated list of safe medications during pregnancy    2. Shortness of breath  Comments:  unable to perform CXR as pt states she is currently pregnant.  Orders:  -     POCT SARS-CoV-2 Antigen FREDY + Flu    3. Sore throat  -     POC Rapid Strep A         I spent 20 minutes caring for Samantha on this date of service. This time includes time spent by me in the following activities:preparing for the visit, reviewing tests, obtaining and/or reviewing a separately obtained history, performing a medically appropriate examination and/or evaluation , counseling and educating the patient/family/caregiver, ordering medications, tests, or procedures, documenting information in the medical record and independently interpreting results and communicating that information with the patient/family/caregiver     Follow Up   Return if symptoms worsen or fail to improve with PCP and/or OB/GYN.    Patient was given instructions and counseling regarding her condition or for health maintenance advice. Please see specific information pulled into the AVS if appropriate.       This document has been electronically signed by LESLIE Hutson  February 14, 2023 15:56 EST

## 2023-03-29 ENCOUNTER — TELEMEDICINE (OUTPATIENT)
Dept: PSYCHIATRY | Facility: CLINIC | Age: 23
End: 2023-03-29
Payer: COMMERCIAL

## 2023-03-29 DIAGNOSIS — F33.2 SEVERE EPISODE OF RECURRENT MAJOR DEPRESSIVE DISORDER, WITHOUT PSYCHOTIC FEATURES: Primary | ICD-10-CM

## 2023-03-29 DIAGNOSIS — F41.1 GENERALIZED ANXIETY DISORDER: ICD-10-CM

## 2023-03-29 DIAGNOSIS — F60.3 BORDERLINE PERSONALITY DISORDER: ICD-10-CM

## 2023-03-29 PROCEDURE — 1160F RVW MEDS BY RX/DR IN RCRD: CPT | Performed by: NURSE PRACTITIONER

## 2023-03-29 PROCEDURE — 99214 OFFICE O/P EST MOD 30 MIN: CPT | Performed by: NURSE PRACTITIONER

## 2023-03-29 PROCEDURE — 1159F MED LIST DOCD IN RCRD: CPT | Performed by: NURSE PRACTITIONER

## 2023-03-29 RX ORDER — SERTRALINE HYDROCHLORIDE 25 MG/1
25 TABLET, FILM COATED ORAL DAILY
Qty: 30 TABLET | Refills: 0 | Status: SHIPPED | OUTPATIENT
Start: 2023-03-29 | End: 2023-04-28

## 2023-03-29 NOTE — PROGRESS NOTES
Review  This provider is located at [facility], [address] [facility] [place] [address].The Patient is seen remotely at [address] [place], [place] [address]lavon [place]ópez [place] [address] via [employer]. Patient is being seen via telehealth and confirm that they are in a secure environment for this session. The patient's condition being diagnosed/treated is appropriate for telemedicine. The provider identified himself/herself: herself as well as her credentials.   The patient gave consent to be seen remotely, and when consent is given they understand that the consent allows for patient identifiable information to be sent to a third party as needed.   They may refuse to be seen remotely at any time. The electronic data is encrypted and password protected, and the patient has been advised of the potential risks to privacy not withstanding such measures.    You have chosen to receive care through a telehealth visit.  Do you consent to use a video/audio connection for your medical care today? Yes    Chief Complaint  Follow-up for mood/anxiety    Subjective    Samantha Dubose presents to [facility] for medication management.     History of Present Illness   Patient presents today after not being seen in several months since she found out she was pregnant and stopped all her medication.  Patient states that her mood has been up and down as she notes she has been more depressed lately and her OB/GYN wanted to talk about getting back on her medications however patient states she is not comfortable taking lamotrigine while pregnant that she is currently 18 weeks and 2 days.  She states she has had a lot of stress lately with fighting with her mother and brother which have been difficult.  She reports that her sleep varies sometimes it is too little and other times not enough.  She states her appetite is good for the most part as she tries to eat more often but at other times does not feel  "hungry.  Patient states she is currently going to a women's clinic in [facility] for OB/GYN care.  She reports she was seeing a Buffy but now seeing a new doctor.  Patient notes that she has felt more depressed and hopeless and helpless and wanting to isolate and down mood.  She notes that she has had decreased concentration and focus.  She states that she has had suicidal thoughts but no plan or intent.  Patient stated \"would go to the ER if things got worse do not want to hurt her baby\".  Patient states that is why she is being open and honest that she wants to get her mood under control but does something safe for the baby but she is worried with having these thoughts but adamantly denies any plan or intent and states that she will go to the ER if things worsened but she wants to get ahead of it right now so it gets better after talking with her OB/GYN.  Patient states she is excited and looking forward to finding out the gender of the baby on April 10 before her birthday.  Patient denies any HI/AH/VH.  Patient states that people are saying that she is a problem because of her borderline personality disorder which has been frustrated for her and notes that she has tried to make her therapy appointments but cannot find a ride so that has been difficult.  Encouraged her to talk with her therapist maybe about doing telemedicine or via the telephone until she can find better transportation.  Patient verbalized understanding.          Objective   Vital Signs:   There were no vitals taken for this visit.  Due to the remote nature of this encounter (virtual encounter), vitals were unable to be obtained.  Height stated at 65 inches.  Weight stated at 120 pounds.      Patient screened positive for depression based on a PHQ-9 score of  on . Follow-up recommendations include: see notes.    PHQ-9 Score:   PHQ-9 Total Score:       Mental Status Exam:   Hygiene:   good  Cooperation:  Cooperative  Eye Contact:  " Fair  Psychomotor Behavior:  Appropriate  Affect:  Full range  Mood: depressed and irritable  Speech:  Normal  Thought Process:  Goal directed and Linear  Thought Content:  Mood congruent  Suicidal:  Suicidal Ideation   Homicidal:  None  Hallucinations:  None  Delusion:  None  Memory:  Intact  Orientation:  Person, Place, Time and Situation  Reliability:  good  Insight:  Fair  Judgement:  Fair  Impulse Control:  Fair  Physical/Medical Issues:  No      Current Medications:   Current Outpatient Medications   Medication Sig Dispense Refill   • rizatriptan MLT (MAXALT-MLT) 10 MG disintegrating tablet TAKE 1 TABLET FOR HEADACHE ONSET MAX OF 2 A DAY     • sertraline (Zoloft) 25 MG tablet Take 1 tablet by mouth Daily for 30 days. 30 tablet 0     No current facility-administered medications for this visit.       Physical Exam  Nursing note reviewed.   Constitutional:       Appearance: Normal appearance. She is not ill-appearing.   Neurological:      Mental Status: She is alert.   Psychiatric:         Attention and Perception: Attention and perception normal.         Mood and Affect: Affect normal. Mood is depressed. Mood is not anxious. Affect is not angry.         Speech: Speech normal.         Behavior: Behavior is cooperative.         Cognition and Memory: Cognition and memory normal.        Result Review :            Patient refuses all labs.     Assessment and Plan    Problem List Items Addressed This Visit        Mental Health    Generalized anxiety disorder (Chronic)    Relevant Medications    sertraline (Zoloft) 25 MG tablet   Other Visit Diagnoses     Severe episode of recurrent major depressive disorder, without psychotic features (HCC)    -  Primary    Relevant Medications    sertraline (Zoloft) 25 MG tablet    Borderline personality disorder (HCC)        Relevant Medications    sertraline (Zoloft) 25 MG tablet            TREATMENT PLAN/GOALS: Continue supportive psychotherapy efforts and medications as  indicated. Treatment and medication options discussed during today's visit. Patient ackowledged and verbally consented to continue with current treatment plan and was educated on the importance of compliance with treatment and follow-up appointments.    MEDICATION ISSUES:  We discussed risks, benefits, and side effects of the above medications and the patient was agreeable with the plan. Patient was educated on the importance of compliance with treatment and follow-up appointments.  Patient is agreeable to call the office with any worsening of symptoms or onset of side effects. Patient is agreeable to call 911 or go to the nearest ER should he/she begin having SI/HI.     -Begin sertraline 25 mg daily for depressive symptoms.  Patient had been on previously and tolerated well as it did not worsen symptoms just ineffective but that was roughly 3 years ago.  Highly encouraged patient if she had any worsening symptoms or concerns or any SI to go immediately to the ER patient verbalized understanding.  -Informed patient that OB/GYN would be contacted but also to inform her OB/GYN as well.  -The patient was educated that her prescribed medications can have potential risk to a developing fetus. Discussed with pt that medications have the potential to cause cardiovascular malformation, decreased gestational age, low birth weight, poor  adaptation, low Apgar scores (some of which could be due to underlying depression or anxiety), and pulmonary hypertension (PPHN).  toxicity reported as transient jitteriness, tremulousness, and tachypnea. The patient has weighed the risks versus benefit and would like to continue the antidepressant. If she changes her decision, she has agreed to contact this APRN.  -Will attempt to call patient's OB/GYN after consent given.      - Patient has failed and tried risperidone, zyprexa, Adderall, citalopram, trazodone, sertraline, amitriptyline, aripiprazole, quetiapine, fluoxetine,  and bupropion      Counseled patient regarding multimodal approach with healthy nutrition, healthy sleep, regular physical activity, social activities, counseling, and medications.      Coping skills reviewed and encouraged positive framing of thoughts     Assisted patient in processing above session content; acknowledged and normalized patient’s thoughts, feelings, and concerns.  Applied  positive coping skills and behavior management in session.  Allowed patient to freely discuss issues without interruption or judgment. Provided safe, confidential environment to facilitate the development of positive therapeutic relationship and encourage open, honest communication. Assisted patient in identifying risk factors which would indicate the need for higher level of care including thoughts to harm self or others and/or self-harming behavior and encouraged patient to contact this office, call 911, or present to the nearest emergency room should any of these events occur. Discussed crisis intervention services and means to access.     MEDS ORDERED DURING VISIT:  New Medications Ordered This Visit   Medications   • sertraline (Zoloft) 25 MG tablet     Sig: Take 1 tablet by mouth Daily for 30 days.     Dispense:  30 tablet     Refill:  0         Follow Up   Return in about 3 weeks (around 4/19/2023), or if symptoms worsen or fail to improve, for Recheck.    Patient was given instructions and counseling regarding her condition or for health maintenance advice. Please see specific information pulled into the AVS if appropriate.     Some of the data in this electronic note has been brought forward from a previous encounter, any necessary changes have been made, it has been reviewed by this APRN, and it is accurate.      This document has been electronically signed by LESLIE Martin  March 29, 2023 14:36 EDT    Part of this note may be an electronic transcription/translation of spoken language to printed text using the  Dragon Dictation System.

## 2023-08-07 ENCOUNTER — TELEPHONE (OUTPATIENT)
Dept: PSYCHIATRY | Facility: CLINIC | Age: 23
End: 2023-08-07
Payer: COMMERCIAL

## 2023-08-07 NOTE — TELEPHONE ENCOUNTER
Is the patient Suicidal or homicidal if so she will need to go to the ER and evaluated by psychiatrist first or I can not see her. IF not suicidal or homicidal and that includes self harm as well I will schedule her sometime this week. Just let me know what mom and patient say. If they give you a hard time please let them know this was my exact words.

## 2023-08-07 NOTE — TELEPHONE ENCOUNTER
Pt mom called and states pt just had a baby and isn't doing well. Pt is very depressed. Pt had the baby at 35 weeks and they were in the hospital for 4 days and sent home but baby had to go back to the hospital. Pt mom states that the hospital isn't willing to release the baby to mom because of her being in this state of depression. Pt mom states that pt has been shutting down and in doing so has been irate with the nurses at the hospital. Pt mom states that the doctor is would like the pt saw as soon as possible  to possibly get back on medication.    Please Advise

## 2023-08-09 ENCOUNTER — TELEMEDICINE (OUTPATIENT)
Dept: PSYCHIATRY | Facility: CLINIC | Age: 23
End: 2023-08-09
Payer: COMMERCIAL

## 2023-08-09 DIAGNOSIS — F60.3 BORDERLINE PERSONALITY DISORDER: ICD-10-CM

## 2023-08-09 DIAGNOSIS — F41.1 GENERALIZED ANXIETY DISORDER: ICD-10-CM

## 2023-08-09 DIAGNOSIS — F33.2 SEVERE EPISODE OF RECURRENT MAJOR DEPRESSIVE DISORDER, WITHOUT PSYCHOTIC FEATURES: Primary | ICD-10-CM

## 2023-08-09 PROCEDURE — 1159F MED LIST DOCD IN RCRD: CPT | Performed by: NURSE PRACTITIONER

## 2023-08-09 PROCEDURE — 90792 PSYCH DIAG EVAL W/MED SRVCS: CPT | Performed by: NURSE PRACTITIONER

## 2023-08-09 PROCEDURE — 1160F RVW MEDS BY RX/DR IN RCRD: CPT | Performed by: NURSE PRACTITIONER

## 2023-08-09 RX ORDER — LAMOTRIGINE 100 MG/1
TABLET ORAL
Qty: 30 TABLET | Refills: 2 | Status: SHIPPED | OUTPATIENT
Start: 2023-08-09

## 2023-08-09 NOTE — PROGRESS NOTES
This provider is located at [facility], [address] [place] [address].The Patient is seen remotely at [facility] in [facility]. Patient is being seen via telehealth and confirm that they are in a secure environment for this session. The patient's condition being diagnosed/treated is appropriate for telemedicine. The provider identified himself/herself: herself as well as her credentials.   The patient and mother gave consent to be seen remotely, and when consent is given they understand that the consent allows for patient identifiable information to be sent to a third party as needed.   They may refuse to be seen remotely at any time. The electronic data is encrypted and password protected, and the patient has been advised of the potential risks to privacy not withstanding such measures.    You have chosen to receive care through a telehealth visit.  Do you consent to use a video/audio connection for your medical care today? Yes. Patient verified Name, , and address.       Chief Complaint  Anger, depression, anxiety    Subjective          Samantha Dubose presents to [facility] for urgent appointment for medication management.     History of Present Illness  Patient presents today after not being seen since 2023.  Patient states that she forgot her appointment and then could not find the number and the number she called did not work according to patient.  Informed her that was an excuse as she had access to her MyChart to find the number and could have contacted her mother who had them as well patient was agreeable with this.  She states the Zoloft somewhat was helpful but she only took it for roughly a month and then was out of medication.  Patient states that 2 weeks ago today she had her daughter on  for vaginal delivery as she was 5 weeks early.  Patient only present with her fianc‚ Beny Marie but not the  father of the child.  Patient was holding her daughter which she named Mesa and affectionate towards her.  Patient states that she went home and the baby could not regulate its temperature so she has been back in the hospital for 1 week and 5 days.  According to patient she became mad at the nursing staff and was very angry and irritable and then also extremely depressed so they encouraged her to make an appointment with myself.  Patient states that she has been on Lamictal 25 mg for 1 week and 5 days now.  According to patient she does not notice much difference but nicki states that there has been slight improvement.  Patient reports that she will also get 3-4 increments but she also notes she is afraid to sleep as something may happen to baby.  Reports her appetite is good.  Patient denies any self-harm.  She reports that she is actually living with her mom to help learn how to take care of the baby.  Patient notes that she does feel pretty depressed and hopeless and helpless due to her being premature and sick for a short period.  Patient states that she has been feeling anxious and nervous and on edge with racing thoughts and worry and irritability and agitation.  See PHQ-9 ZANE-7.  Patient states that she has had suicidal ideation that has been passive.  She states she has had thoughts that she wants to die but adamantly denies any plan or intent and then states other times the thoughts just come and go with no plan or intent.  Adamantly denies any HI or any auditory or visual hallucination.  That she does get trading feeding her daughter but states that she gives it to the father and then steps back as she feels that is best.  Encouraged patient that she needs to start therapy as well as see Dr. Cheng as she states she had a seizure 3 to 4 days ago that lasted for a minute which patient states she knows is a safety risk to her baby.  Patient feels that she is bonding well and loving cares for the  baby.  According to patient's mother the nursing staff already has DCBS involved.  They state the couple have been told twice and security has been called due to arguing.  They also notes that the baby had a low weight as they were not waking up to feeding it.  Mother states that she was finding herself doing most of the work instead of them as they were sleeping which was noted by nursing staff according to mother as well.  According to mother  Stated that they were afraid that she would take her frustrations out on the baby or not wake up to feed it which is why DCBS will be involved.  Mother states the hospital just wanted her to have an appointment before she left and the baby's weight improving before she was discharged home.  Mother states there is a possibility that she may have to get temporary guardianship if needed if they cannot care for the baby.  Mother states that she is willing to do that and has told her daughter that the baby needs to come first and she needs to get back in therapy and keep her appointments which mother and her both agree with.  This was discussed with mother while patient was not present as patient states she never talked with the doctors and nurses about this they just talked with mother.  Conflicting stories but patient adamantly denied any HI or auditory or visual hallucinations.  Admits to SI but adamantly denied any plan or intent and feels that she just needs back on her Lamictal as that fixed most things for her according to patient.  Patient is bonding well and holding baby but DCBS and nurses will continue monitoring the care that she provides while in the hospital.  Patient is set to go home with the mother and stay with her with child and fianc‚ according to both the patient and mother.        Objective   Vital Signs:   There were no vitals taken for this visit.  Due to the remote nature of this encounter (virtual encounter), vitals were unable to be obtained.  Height  stated at 65 inches.  Weight stated at 120 pounds.      PHQ-9 Score:   PHQ-9 Total Score:       PHQ-9 Depression Screening  Little interest or pleasure in doing things? 2-->more than half the days   Feeling down, depressed, or hopeless? 3-->nearly every day   Trouble falling or staying asleep, or sleeping too much? 2-->more than half the days   Feeling tired or having little energy? 2-->more than half the days   Poor appetite or overeating? 1-->several days   Feeling bad about yourself - or that you are a failure or have let yourself or your family down? 2-->more than half the days   Trouble concentrating on things, such as reading the newspaper or watching television? 0-->not at all   Moving or speaking so slowly that other people could have noticed? Or the opposite - being so fidgety or restless that you have been moving around a lot more than usual? 1-->several days   Thoughts that you would be better off dead, or of hurting yourself in some way? 1-->several days   PHQ-9 Total Score 14   If you checked off any problems, how difficult have these problems made it for you to do your work, take care of things at home, or get along with other people?        PHQ-9 Total Score: 14    ZANE-7  Feeling nervous, anxious or on edge: Nearly every day  Not being able to stop or control worrying: More than half the days  Worrying too much about different things: More than half the days  Trouble Relaxing: More than half the days  Being so restless that it is hard to sit still: More than half the days  Feeling afraid as if something awful might happen: Nearly every day  Becoming easily annoyed or irritable: More than half the days  ZANE 7 Total Score: 16  If you checked any problems, how difficult have these problems made it for you to do your work, take care of things at home, or get along with other people: Very difficult      Patient screened positive for depression based on a PHQ-9 score of 14 on 8/9/2023. Follow-up  recommendations include:  see notes and medication list. DCBS involved in case .        Mental Status Exam:   Hygiene:   good  Cooperation:  Cooperative  Eye Contact:  Good  Psychomotor Behavior:  Restless  Affect:  Appropriate  Mood: depressed and anxious  Speech:  Normal  Thought Process:  Goal directed  Thought Content:  Mood incongruent  Suicidal:  Suicidal Ideation  Homicidal:  None  Hallucinations:  None  Delusion:  None  Memory:  Intact  Orientation:  Person, Place, Time, and Situation  Reliability:  fair  Insight:  Fair and Poor  Judgement:  Fair  Impulse Control:  Fair  Physical/Medical Issues:  Yes seizures      Current Medications:   Current Outpatient Medications   Medication Sig Dispense Refill    lamoTRIgine (LaMICtal) 100 MG tablet Take 1/2 tablet for 1 week if no side effects or rash then take 1 tablet daily. 30 tablet 2     No current facility-administered medications for this visit.       Physical Exam  Exam conducted with a chaperone present.   Constitutional:       Appearance: Normal appearance.   Neurological:      Mental Status: She is alert.   Psychiatric:         Attention and Perception: Perception normal. She is inattentive.         Mood and Affect: Mood is anxious and depressed.         Speech: Speech normal.         Behavior: Behavior is agitated. Behavior is cooperative.         Cognition and Memory: Cognition and memory normal.      Result Review :     The following data was reviewed by: LESLIE Martin on 08/09/2023:                  Data reviewed : previous notes         Assessment and Plan    Problem List Items Addressed This Visit          Mental Health    Generalized anxiety disorder (Chronic)    Relevant Medications    lamoTRIgine (LaMICtal) 100 MG tablet     Other Visit Diagnoses       Severe episode of recurrent major depressive disorder, without psychotic features    -  Primary    Relevant Medications    lamoTRIgine (LaMICtal) 100 MG tablet    Borderline personality  disorder        Relevant Medications    lamoTRIgine (LaMICtal) 100 MG tablet    Postpartum depression        Relevant Medications    lamoTRIgine (LaMICtal) 100 MG tablet              TREATMENT PLAN/GOALS: Continue supportive psychotherapy efforts and medications as indicated. Treatment and medication options discussed during today's visit. Patient ackowledged and verbally consented to continue with current treatment plan and was educated on the importance of compliance with treatment and follow-up appointments.    MEDICATION ISSUES:  We discussed risks, benefits, and side effects of the above medications and the patient was agreeable with the plan. Patient was educated on the importance of compliance with treatment and follow-up appointments.  Patient is agreeable to call the office with any worsening of symptoms or onset of side effects. Patient is agreeable to call 911 or go to the nearest ER should he/she begin having SI/HI. We will increase Lamictal in an effort to stabilize mood.  The patient was reminded to immediately come to the hospital should there be any loss of control.  Explanation was given to her regarding Lamictal and the potential for Dylan Stoney syndrome and significant rash.  Patient was encouraged to check skin prior to beginning.  Patient was encouraged to report any rash and to immediately stop medication.    -Began taking 50 mg daily now of lamotrigine for 1 week and then start taking 100 mg daily for mood as well as depression and anxiety.  -We will follow-up in 1 week and continue monitoring mood to see if we need to add Zoloft or BuSpar to medication list.  -Patient given number to clinic as they wrote down.  Highly encouraged patient that she needs to make a therapy appointment as well as a neurology appointment.  -Encouraged her to make an appointment with the health department as she states she needs to follow-up for k-chip or other benefits.     -Informed patient that she is an adult  and a mother and she needs to prioritize this and taking her medications and going to be appropriately in order to care for her child.  Informed her that if she had any feelings of anger or thoughts to hurt the child to ensure that it is with someone else and immediately go to the nearest ER she verbalized understanding.  Encouraged the patient is noncompliant with her medications and therapy and has any more episodes she is at risk for DCBS getting involved in the care of her child patient was agreeable and verbalized understanding.     Counseled patient regarding multimodal approach with healthy nutrition, healthy sleep, regular physical activity, social activities, counseling, and medications.      Coping skills reviewed and encouraged positive framing of thoughts     Assisted patient in processing above session content; acknowledged and normalized patient's thoughts, feelings, and concerns.  Applied  positive coping skills and behavior management in session.  Allowed patient to freely discuss issues without interruption or judgment. Provided safe, confidential environment to facilitate the development of positive therapeutic relationship and encourage open, honest communication. Assisted patient in identifying risk factors which would indicate the need for higher level of care including thoughts to harm self or others and/or self-harming behavior and encouraged patient to contact this office, call 911, or present to the nearest emergency room should any of these events occur. Discussed crisis intervention services and means to access.     MEDS ORDERED DURING VISIT:  New Medications Ordered This Visit   Medications    lamoTRIgine (LaMICtal) 100 MG tablet     Sig: Take 1/2 tablet for 1 week if no side effects or rash then take 1 tablet daily.     Dispense:  30 tablet     Refill:  2           Follow Up   Return in about 1 week (around 8/16/2023), or if symptoms worsen or fail to improve, for Recheck.    Patient was  given instructions and counseling regarding her condition or for health maintenance advice. Please see specific information pulled into the AVS if appropriate.     Some of the data in this electronic note has been brought forward from a previous encounter, any necessary changes have been made, it has been reviewed by this APRN, and it is accurate.      This document has been electronically signed by LESLIE Martin  August 9, 2023 10:34 EDT    Part of this note may be an electronic transcription/translation of spoken language to printed text using the Dragon Dictation System.

## 2023-08-16 ENCOUNTER — TELEMEDICINE (OUTPATIENT)
Dept: PSYCHIATRY | Facility: CLINIC | Age: 23
End: 2023-08-16
Payer: COMMERCIAL

## 2023-08-16 DIAGNOSIS — F60.3 BORDERLINE PERSONALITY DISORDER: ICD-10-CM

## 2023-08-16 DIAGNOSIS — F41.1 GENERALIZED ANXIETY DISORDER: ICD-10-CM

## 2023-08-16 DIAGNOSIS — F33.2 SEVERE EPISODE OF RECURRENT MAJOR DEPRESSIVE DISORDER, WITHOUT PSYCHOTIC FEATURES: Primary | ICD-10-CM

## 2023-08-16 PROCEDURE — 99214 OFFICE O/P EST MOD 30 MIN: CPT | Performed by: NURSE PRACTITIONER

## 2023-08-16 PROCEDURE — 1160F RVW MEDS BY RX/DR IN RCRD: CPT | Performed by: NURSE PRACTITIONER

## 2023-08-16 PROCEDURE — 1159F MED LIST DOCD IN RCRD: CPT | Performed by: NURSE PRACTITIONER

## 2023-08-16 NOTE — PROGRESS NOTES
This provider is located at [facility], [address] [place] [address].The Patient is seen remotely at [facility] in [facility]. Patient is being seen via telehealth and confirm that they are in a secure environment for this session. The patient's condition being diagnosed/treated is appropriate for telemedicine. The provider identified himself/herself: herself as well as her credentials.   The patient and mother gave consent to be seen remotely, and when consent is given they understand that the consent allows for patient identifiable information to be sent to a third party as needed.   They may refuse to be seen remotely at any time. The electronic data is encrypted and password protected, and the patient has been advised of the potential risks to privacy not withstanding such measures.    You have chosen to receive care through a telehealth visit.  Do you consent to use a video/audio connection for your medical care today? Yes. Patient verified Name, , and address.       Chief Complaint  Anger, depression, anxiety    Subjective    Samantha Dubose presents to [facility] for urgent appointment for medication management.     History of Present Illness  Patient presents today reporting that she got to go home last week after her visit with her child.  Patient states that she went to her mother's house where she is going to be staying.  Patient reports things have been okay this week.  Patient states that her depression has been better this week.  She states that she is still felt down at times but much better than where she was at noting a 5-6 on a scale of 0-10 with 10 being the worst.  Patient denies having any SI or any thoughts of self-harm or any HI.  Patient states she is bonding well with the baby.  She reports that she is up 2 to 3 hours with feeding but sometimes it is her fianc‚ and sometimes it is her mother.  She reports  that she is sleeping well as it just varies.  Patient states that she did take her child to the children's clinic and her weight was very good and she has been eating well.  Patient reports that her appetite has been down and she is struggling to feel hungry but still eating at times encouraged increased protein.  Denies any binging or purging.  Patient states she has also been in pain due to a tooth needing pulled but otherwise doing well.  Patient states that anger and irritability are still present but has been getting better.  Rates her anxiety does same a 5-6 on a scale of 0-10 with 10 being the worst stating that her anxiousness has improved but still present and worried at times.  Denies any rash or side effects to medications.  Denies any SI/HI/AH/VH.        Objective   Vital Signs:   There were no vitals taken for this visit.  Due to the remote nature of this encounter (virtual encounter), vitals were unable to be obtained.  Height stated at 65 inches.  Weight stated at 116 pounds.      PHQ-9 Score:   PHQ-9 Total Score:       PHQ-9 Depression Screening  Little interest or pleasure in doing things? 2-->more than half the days   Feeling down, depressed, or hopeless? 3-->nearly every day   Trouble falling or staying asleep, or sleeping too much? 2-->more than half the days   Feeling tired or having little energy? 2-->more than half the days   Poor appetite or overeating? 1-->several days   Feeling bad about yourself - or that you are a failure or have let yourself or your family down? 2-->more than half the days   Trouble concentrating on things, such as reading the newspaper or watching television? 0-->not at all   Moving or speaking so slowly that other people could have noticed? Or the opposite - being so fidgety or restless that you have been moving around a lot more than usual? 1-->several days   Thoughts that you would be better off dead, or of hurting yourself in some way? 1-->several days   PHQ-9 Total  Score 14   If you checked off any problems, how difficult have these problems made it for you to do your work, take care of things at home, or get along with other people?        PHQ-9 Total Score: 14    ZANE-7  Feeling nervous, anxious or on edge: Nearly every day  Not being able to stop or control worrying: More than half the days  Worrying too much about different things: More than half the days  Trouble Relaxing: More than half the days  Being so restless that it is hard to sit still: More than half the days  Feeling afraid as if something awful might happen: Nearly every day  Becoming easily annoyed or irritable: More than half the days  ZANE 7 Total Score: 16  If you checked any problems, how difficult have these problems made it for you to do your work, take care of things at home, or get along with other people: Very difficult      Patient screened positive for depression based on a PHQ-9 score of 14 on 8/9/2023. Follow-up recommendations include:  see notes and medication list. DCBS involved in case .        Mental Status Exam:   Hygiene:   good  Cooperation:  Cooperative  Eye Contact:  Good  Psychomotor Behavior:  Restless  Affect:  Appropriate  Mood: depressed and anxious  Speech:  Normal  Thought Process:  Goal directed  Thought Content:  Mood incongruent  Suicidal:  Suicidal Ideation  Homicidal:  None  Hallucinations:  None  Delusion:  None  Memory:  Intact  Orientation:  Person, Place, Time, and Situation  Reliability:  fair  Insight:  Fair and Poor  Judgement:  Fair  Impulse Control:  Fair  Physical/Medical Issues:  Yes seizures      Current Medications:   Current Outpatient Medications   Medication Sig Dispense Refill    lamoTRIgine (LaMICtal) 100 MG tablet Take 1/2 tablet for 1 week if no side effects or rash then take 1 tablet daily. 30 tablet 2     No current facility-administered medications for this visit.       Physical Exam  Exam conducted with a chaperone present.   Constitutional:        Appearance: Normal appearance.   Neurological:      Mental Status: She is alert.   Psychiatric:         Attention and Perception: Attention and perception normal. She is attentive.         Mood and Affect: Mood is anxious and depressed.         Speech: Speech normal.         Behavior: Behavior is agitated. Behavior is cooperative.         Cognition and Memory: Cognition and memory normal.      Result Review :     The following data was reviewed by: LESLIE Martin on 08/09/2023:                  Data reviewed : previous notes         Assessment and Plan    Problem List Items Addressed This Visit          Mental Health    Generalized anxiety disorder (Chronic)    Relevant Medications    lamoTRIgine (LaMICtal) 100 MG tablet     Other Visit Diagnoses       Severe episode of recurrent major depressive disorder, without psychotic features    -  Primary    Relevant Medications    lamoTRIgine (LaMICtal) 100 MG tablet    Borderline personality disorder        Relevant Medications    lamoTRIgine (LaMICtal) 100 MG tablet    Postpartum depression        Relevant Medications    lamoTRIgine (LaMICtal) 100 MG tablet              TREATMENT PLAN/GOALS: Continue supportive psychotherapy efforts and medications as indicated. Treatment and medication options discussed during today's visit. Patient ackowledged and verbally consented to continue with current treatment plan and was educated on the importance of compliance with treatment and follow-up appointments.    MEDICATION ISSUES:  We discussed risks, benefits, and side effects of the above medications and the patient was agreeable with the plan. Patient was educated on the importance of compliance with treatment and follow-up appointments.  Patient is agreeable to call the office with any worsening of symptoms or onset of side effects. Patient is agreeable to call 911 or go to the nearest ER should he/she begin having SI/HI. We will increase Lamictal in an effort to  stabilize mood.  The patient was reminded to immediately come to the hospital should there be any loss of control.  Explanation was given to her regarding Lamictal and the potential for Dylan Stoney syndrome and significant rash.  Patient was encouraged to check skin prior to beginning.  Patient was encouraged to report any rash and to immediately stop medication.    -Patient now can take a whole tablet totaling 100 mg daily of lamotrigine for depression and anxiety and mood.  Highly encouraged patient if she had any worsening symptoms or side effects to contact the clinic for sooner appointment she verbalized understanding.  -Continue psychotherapy appointments.  -Encouraged her to make an appointment with the health department as she states she needs to follow-up for k-chip or other benefits.     -Informed patient that she is an adult and a mother and she needs to prioritize this and taking her medications and going to be appropriately in order to care for her child.  Informed her that if she had any feelings of anger or thoughts to hurt the child to ensure that it is with someone else and immediately go to the nearest ER she verbalized understanding.  Encouraged the patient is noncompliant with her medications and therapy and has any more episodes she is at risk for DCBS getting involved in the care of her child patient was agreeable and verbalized understanding.     Counseled patient regarding multimodal approach with healthy nutrition, healthy sleep, regular physical activity, social activities, counseling, and medications.      Coping skills reviewed and encouraged positive framing of thoughts     Assisted patient in processing above session content; acknowledged and normalized patient's thoughts, feelings, and concerns.  Applied  positive coping skills and behavior management in session.  Allowed patient to freely discuss issues without interruption or judgment. Provided safe, confidential environment to  facilitate the development of positive therapeutic relationship and encourage open, honest communication. Assisted patient in identifying risk factors which would indicate the need for higher level of care including thoughts to harm self or others and/or self-harming behavior and encouraged patient to contact this office, call 911, or present to the nearest emergency room should any of these events occur. Discussed crisis intervention services and means to access.     MEDS ORDERED DURING VISIT:  New Medications Ordered This Visit   Medications    lamoTRIgine (LaMICtal) 100 MG tablet     Sig: Take 1/2 tablet for 1 week if no side effects or rash then take 1 tablet daily.     Dispense:  30 tablet     Refill:  2           Follow Up   Return in about 1 week (around 8/16/2023), or if symptoms worsen or fail to improve, for Recheck.    Patient was given instructions and counseling regarding her condition or for health maintenance advice. Please see specific information pulled into the AVS if appropriate.     Some of the data in this electronic note has been brought forward from a previous encounter, any necessary changes have been made, it has been reviewed by this APRN, and it is accurate.      This document has been electronically signed by LESLIE Martin  August 16, 2023 09:37 EDT    Part of this note may be an electronic transcription/translation of spoken language to printed text using the Dragon Dictation System.

## 2023-08-30 ENCOUNTER — TELEPHONE (OUTPATIENT)
Dept: PSYCHIATRY | Facility: CLINIC | Age: 23
End: 2023-08-30

## 2024-01-07 ENCOUNTER — HOSPITAL ENCOUNTER (INPATIENT)
Facility: HOSPITAL | Age: 24
LOS: 3 days | Discharge: HOME | DRG: 882 | End: 2024-01-10
Attending: PSYCHIATRY & NEUROLOGY | Admitting: PSYCHIATRY & NEUROLOGY
Payer: COMMERCIAL

## 2024-01-07 ENCOUNTER — HOSPITAL ENCOUNTER (EMERGENCY)
Facility: HOSPITAL | Age: 24
Discharge: PSYCHIATRIC OR SUBSTANCE USE FACILITY | DRG: 882 | End: 2024-01-07
Attending: EMERGENCY MEDICINE | Admitting: EMERGENCY MEDICINE
Payer: COMMERCIAL

## 2024-01-07 VITALS
HEIGHT: 65 IN | HEART RATE: 94 BPM | SYSTOLIC BLOOD PRESSURE: 117 MMHG | OXYGEN SATURATION: 100 % | TEMPERATURE: 97.3 F | DIASTOLIC BLOOD PRESSURE: 76 MMHG | BODY MASS INDEX: 17.83 KG/M2 | RESPIRATION RATE: 16 BRPM | WEIGHT: 107 LBS

## 2024-01-07 DIAGNOSIS — R45.851 SUICIDAL IDEATIONS: Primary | ICD-10-CM

## 2024-01-07 PROBLEM — F32.9 MDD (MAJOR DEPRESSIVE DISORDER): Status: ACTIVE | Noted: 2024-01-07

## 2024-01-07 LAB
ALBUMIN SERPL-MCNC: 4.5 G/DL (ref 3.5–5.2)
ALBUMIN/GLOB SERPL: 1.6 G/DL
ALP SERPL-CCNC: 46 U/L (ref 39–117)
ALT SERPL W P-5'-P-CCNC: 6 U/L (ref 1–33)
AMPHET+METHAMPHET UR QL: NEGATIVE
AMPHETAMINES UR QL: NEGATIVE
ANION GAP SERPL CALCULATED.3IONS-SCNC: 10.6 MMOL/L (ref 5–15)
AST SERPL-CCNC: 12 U/L (ref 1–32)
BACTERIA UR QL AUTO: ABNORMAL /HPF
BARBITURATES UR QL SCN: NEGATIVE
BASOPHILS # BLD AUTO: 0.07 10*3/MM3 (ref 0–0.2)
BASOPHILS NFR BLD AUTO: 0.7 % (ref 0–1.5)
BENZODIAZ UR QL SCN: NEGATIVE
BILIRUB SERPL-MCNC: 0.5 MG/DL (ref 0–1.2)
BILIRUB UR QL STRIP: NEGATIVE
BUN SERPL-MCNC: 13 MG/DL (ref 6–20)
BUN/CREAT SERPL: 16.7 (ref 7–25)
BUPRENORPHINE SERPL-MCNC: NEGATIVE NG/ML
CALCIUM SPEC-SCNC: 9 MG/DL (ref 8.6–10.5)
CANNABINOIDS SERPL QL: NEGATIVE
CHLORIDE SERPL-SCNC: 108 MMOL/L (ref 98–107)
CLARITY UR: CLEAR
CO2 SERPL-SCNC: 20.4 MMOL/L (ref 22–29)
COCAINE UR QL: NEGATIVE
COLOR UR: YELLOW
CREAT SERPL-MCNC: 0.78 MG/DL (ref 0.57–1)
DEPRECATED RDW RBC AUTO: 47 FL (ref 37–54)
EGFRCR SERPLBLD CKD-EPI 2021: 109.6 ML/MIN/1.73
EOSINOPHIL # BLD AUTO: 0.04 10*3/MM3 (ref 0–0.4)
EOSINOPHIL NFR BLD AUTO: 0.4 % (ref 0.3–6.2)
ERYTHROCYTE [DISTWIDTH] IN BLOOD BY AUTOMATED COUNT: 14.7 % (ref 12.3–15.4)
ETHANOL BLD-MCNC: <10 MG/DL (ref 0–10)
ETHANOL UR QL: <0.01 %
FENTANYL UR-MCNC: NEGATIVE NG/ML
GLOBULIN UR ELPH-MCNC: 2.8 GM/DL
GLUCOSE SERPL-MCNC: 98 MG/DL (ref 65–99)
GLUCOSE UR STRIP-MCNC: NEGATIVE MG/DL
HAV IGM SERPL QL IA: NORMAL
HBV CORE IGM SERPL QL IA: NORMAL
HBV SURFACE AG SERPL QL IA: NORMAL
HCG SERPL QL: NEGATIVE
HCT VFR BLD AUTO: 37.3 % (ref 34–46.6)
HCV AB SER DONR QL: NORMAL
HGB BLD-MCNC: 11.3 G/DL (ref 12–15.9)
HGB UR QL STRIP.AUTO: NEGATIVE
HOLD SPECIMEN: NORMAL
HYALINE CASTS UR QL AUTO: ABNORMAL /LPF
IMM GRANULOCYTES # BLD AUTO: 0.04 10*3/MM3 (ref 0–0.05)
IMM GRANULOCYTES NFR BLD AUTO: 0.4 % (ref 0–0.5)
KETONES UR QL STRIP: ABNORMAL
LEUKOCYTE ESTERASE UR QL STRIP.AUTO: ABNORMAL
LYMPHOCYTES # BLD AUTO: 2.37 10*3/MM3 (ref 0.7–3.1)
LYMPHOCYTES NFR BLD AUTO: 23.3 % (ref 19.6–45.3)
MAGNESIUM SERPL-MCNC: 1.8 MG/DL (ref 1.6–2.6)
MCH RBC QN AUTO: 26.7 PG (ref 26.6–33)
MCHC RBC AUTO-ENTMCNC: 30.3 G/DL (ref 31.5–35.7)
MCV RBC AUTO: 88.2 FL (ref 79–97)
METHADONE UR QL SCN: NEGATIVE
MONOCYTES # BLD AUTO: 0.7 10*3/MM3 (ref 0.1–0.9)
MONOCYTES NFR BLD AUTO: 6.9 % (ref 5–12)
MUCOUS THREADS URNS QL MICRO: ABNORMAL /HPF
NEUTROPHILS NFR BLD AUTO: 6.94 10*3/MM3 (ref 1.7–7)
NEUTROPHILS NFR BLD AUTO: 68.3 % (ref 42.7–76)
NITRITE UR QL STRIP: NEGATIVE
NRBC BLD AUTO-RTO: 0 /100 WBC (ref 0–0.2)
OPIATES UR QL: NEGATIVE
OXYCODONE UR QL SCN: NEGATIVE
PCP UR QL SCN: NEGATIVE
PH UR STRIP.AUTO: <=5 [PH] (ref 5–8)
PLATELET # BLD AUTO: 215 10*3/MM3 (ref 140–450)
PMV BLD AUTO: 10.2 FL (ref 6–12)
POTASSIUM SERPL-SCNC: 4 MMOL/L (ref 3.5–5.2)
PROT SERPL-MCNC: 7.3 G/DL (ref 6–8.5)
PROT UR QL STRIP: ABNORMAL
QT INTERVAL: 434 MS
QTC INTERVAL: 458 MS
RBC # BLD AUTO: 4.23 10*6/MM3 (ref 3.77–5.28)
RBC # UR STRIP: ABNORMAL /HPF
REF LAB TEST METHOD: ABNORMAL
SODIUM SERPL-SCNC: 139 MMOL/L (ref 136–145)
SP GR UR STRIP: >1.03 (ref 1–1.03)
SQUAMOUS #/AREA URNS HPF: ABNORMAL /HPF
TRICYCLICS UR QL SCN: NEGATIVE
UROBILINOGEN UR QL STRIP: ABNORMAL
WBC # UR STRIP: ABNORMAL /HPF
WBC NRBC COR # BLD AUTO: 10.16 10*3/MM3 (ref 3.4–10.8)

## 2024-01-07 PROCEDURE — 82077 ASSAY SPEC XCP UR&BREATH IA: CPT | Performed by: EMERGENCY MEDICINE

## 2024-01-07 PROCEDURE — 93005 ELECTROCARDIOGRAM TRACING: CPT | Performed by: PSYCHIATRY & NEUROLOGY

## 2024-01-07 PROCEDURE — 99223 1ST HOSP IP/OBS HIGH 75: CPT | Performed by: PSYCHIATRY & NEUROLOGY

## 2024-01-07 PROCEDURE — 85025 COMPLETE CBC W/AUTO DIFF WBC: CPT | Performed by: EMERGENCY MEDICINE

## 2024-01-07 PROCEDURE — 93010 ELECTROCARDIOGRAM REPORT: CPT | Performed by: INTERNAL MEDICINE

## 2024-01-07 PROCEDURE — 84703 CHORIONIC GONADOTROPIN ASSAY: CPT | Performed by: EMERGENCY MEDICINE

## 2024-01-07 PROCEDURE — 36415 COLL VENOUS BLD VENIPUNCTURE: CPT

## 2024-01-07 PROCEDURE — 81001 URINALYSIS AUTO W/SCOPE: CPT | Performed by: EMERGENCY MEDICINE

## 2024-01-07 PROCEDURE — 83735 ASSAY OF MAGNESIUM: CPT | Performed by: EMERGENCY MEDICINE

## 2024-01-07 PROCEDURE — 80053 COMPREHEN METABOLIC PANEL: CPT | Performed by: EMERGENCY MEDICINE

## 2024-01-07 PROCEDURE — 80307 DRUG TEST PRSMV CHEM ANLYZR: CPT | Performed by: EMERGENCY MEDICINE

## 2024-01-07 PROCEDURE — 80074 ACUTE HEPATITIS PANEL: CPT | Performed by: PSYCHIATRY & NEUROLOGY

## 2024-01-07 PROCEDURE — 99285 EMERGENCY DEPT VISIT HI MDM: CPT

## 2024-01-07 RX ORDER — BENZTROPINE MESYLATE 1 MG/1
2 TABLET ORAL ONCE AS NEEDED
Status: DISCONTINUED | OUTPATIENT
Start: 2024-01-07 | End: 2024-01-10 | Stop reason: HOSPADM

## 2024-01-07 RX ORDER — IBUPROFEN 400 MG/1
400 TABLET ORAL EVERY 6 HOURS PRN
Status: DISCONTINUED | OUTPATIENT
Start: 2024-01-07 | End: 2024-01-10 | Stop reason: HOSPADM

## 2024-01-07 RX ORDER — LOPERAMIDE HYDROCHLORIDE 2 MG/1
2 CAPSULE ORAL
Status: DISCONTINUED | OUTPATIENT
Start: 2024-01-07 | End: 2024-01-10 | Stop reason: HOSPADM

## 2024-01-07 RX ORDER — ECHINACEA PURPUREA EXTRACT 125 MG
2 TABLET ORAL AS NEEDED
Status: DISCONTINUED | OUTPATIENT
Start: 2024-01-07 | End: 2024-01-10 | Stop reason: HOSPADM

## 2024-01-07 RX ORDER — HYDROXYZINE 50 MG/1
50 TABLET, FILM COATED ORAL EVERY 6 HOURS PRN
Status: DISCONTINUED | OUTPATIENT
Start: 2024-01-07 | End: 2024-01-10 | Stop reason: HOSPADM

## 2024-01-07 RX ORDER — BENZTROPINE MESYLATE 1 MG/ML
1 INJECTION INTRAMUSCULAR; INTRAVENOUS ONCE AS NEEDED
Status: DISCONTINUED | OUTPATIENT
Start: 2024-01-07 | End: 2024-01-10 | Stop reason: HOSPADM

## 2024-01-07 RX ORDER — TRAZODONE HYDROCHLORIDE 50 MG/1
50 TABLET ORAL NIGHTLY PRN
Status: DISCONTINUED | OUTPATIENT
Start: 2024-01-07 | End: 2024-01-10 | Stop reason: HOSPADM

## 2024-01-07 RX ORDER — ACETAMINOPHEN 325 MG/1
650 TABLET ORAL EVERY 6 HOURS PRN
Status: DISCONTINUED | OUTPATIENT
Start: 2024-01-07 | End: 2024-01-10 | Stop reason: HOSPADM

## 2024-01-07 RX ORDER — LEVETIRACETAM 500 MG/1
500 TABLET ORAL EVERY 12 HOURS SCHEDULED
Status: DISCONTINUED | OUTPATIENT
Start: 2024-01-07 | End: 2024-01-08

## 2024-01-07 RX ORDER — NITROFURANTOIN 25; 75 MG/1; MG/1
100 CAPSULE ORAL EVERY 12 HOURS SCHEDULED
Status: DISCONTINUED | OUTPATIENT
Start: 2024-01-07 | End: 2024-01-10 | Stop reason: HOSPADM

## 2024-01-07 RX ORDER — FAMOTIDINE 20 MG/1
20 TABLET, FILM COATED ORAL 2 TIMES DAILY PRN
Status: DISCONTINUED | OUTPATIENT
Start: 2024-01-07 | End: 2024-01-10 | Stop reason: HOSPADM

## 2024-01-07 RX ORDER — BENZONATATE 100 MG/1
100 CAPSULE ORAL 3 TIMES DAILY PRN
Status: DISCONTINUED | OUTPATIENT
Start: 2024-01-07 | End: 2024-01-10 | Stop reason: HOSPADM

## 2024-01-07 RX ORDER — LAMOTRIGINE 100 MG/1
25 TABLET ORAL DAILY
Status: DISCONTINUED | OUTPATIENT
Start: 2024-01-07 | End: 2024-01-10 | Stop reason: HOSPADM

## 2024-01-07 RX ORDER — ONDANSETRON 4 MG/1
4 TABLET, FILM COATED ORAL EVERY 6 HOURS PRN
Status: DISCONTINUED | OUTPATIENT
Start: 2024-01-07 | End: 2024-01-10 | Stop reason: HOSPADM

## 2024-01-07 RX ORDER — ALUMINA, MAGNESIA, AND SIMETHICONE 2400; 2400; 240 MG/30ML; MG/30ML; MG/30ML
15 SUSPENSION ORAL EVERY 6 HOURS PRN
Status: DISCONTINUED | OUTPATIENT
Start: 2024-01-07 | End: 2024-01-10 | Stop reason: HOSPADM

## 2024-01-07 RX ADMIN — LAMOTRIGINE 25 MG: 100 TABLET ORAL at 15:21

## 2024-01-07 RX ADMIN — NITROFURANTOIN MONOHYDRATE/MACROCRYSTALLINE 100 MG: 25; 75 CAPSULE ORAL at 21:28

## 2024-01-07 RX ADMIN — LEVETIRACETAM 500 MG: 500 TABLET, FILM COATED ORAL at 21:28

## 2024-01-07 RX ADMIN — NITROFURANTOIN MONOHYDRATE/MACROCRYSTALLINE 100 MG: 25; 75 CAPSULE ORAL at 08:56
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
## 2024-01-07 NOTE — PLAN OF CARE
Goal Outcome Evaluation:  Plan of Care Reviewed With: patient  Patient Agreement with Plan of Care: agrees     Progress: improving  Outcome Evaluation: Patient new admission this am. Has showered and been out of room this afternoon. Affect more appropriate. Interacting well with roomate and others. Will continue to monitor.
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
## 2024-01-07 NOTE — NURSING NOTE
Called and reviewed ER suggestion for abx for uti. Instructed ok to start margaroid 100 mg po bid for ten days. [id number]
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
## 2024-01-07 NOTE — NURSING NOTE
Patient states she is going to leave. Cursing. Explained to pt that the psychiatrist did not feel comfortable letting her go.     Called and made Dr. Rasheed aware. Instructed to put her on a 72 hour hold.

## 2024-01-08 LAB — GLUCOSE BLDC GLUCOMTR-MCNC: 116 MG/DL (ref 70–130)

## 2024-01-08 PROCEDURE — 82948 REAGENT STRIP/BLOOD GLUCOSE: CPT

## 2024-01-08 PROCEDURE — 99232 SBSQ HOSP IP/OBS MODERATE 35: CPT | Performed by: PSYCHIATRY & NEUROLOGY

## 2024-01-08 RX ORDER — LAMOTRIGINE 100 MG/1
25 TABLET ORAL ONCE
Status: COMPLETED | OUTPATIENT
Start: 2024-01-08 | End: 2024-01-08

## 2024-01-08 RX ADMIN — NITROFURANTOIN MONOHYDRATE/MACROCRYSTALLINE 100 MG: 25; 75 CAPSULE ORAL at 20:53

## 2024-01-08 RX ADMIN — LAMOTRIGINE 25 MG: 100 TABLET ORAL at 21:30

## 2024-01-08 RX ADMIN — NITROFURANTOIN MONOHYDRATE/MACROCRYSTALLINE 100 MG: 25; 75 CAPSULE ORAL at 09:16

## 2024-01-08 NOTE — NURSING NOTE
Called pager for Dr. Doan at 03:43 am realized I didn't put in extension # called back at 04:15 am and entered correct number. Awaiting call back.  
Consult with Dr. Doan, patient is asymptomatic and resting in bed at this time. Checked on patient while on the phone with ;pt. voices feeling better.   Will follow up with vitals and give report for Dr. Zamudio in the morning about medication given at med [facility] that the  patient voiced concerns about talking Keppra 500 mg;stated Lamitcal 25 mg still made her feel fuzzy.    
Patient complains of dizziness, light headed, and headache on left side of her head.Pt. BP: 98/58, P: 101 at 02:00 vitals. Hydrated patient with Gatorade and food then reassessed at 03:04 am BP: 98/60, P: 74. Patient had medication change today, Keppra 500 mg at 21:28 pm.     Called Dr. Salinas;NEW ORDER: HOSPITALIST CONSULT URGENT; symptomatic hypotension      
Talked to pt POA whom is her father Griffin Dubose after research in which verified he was guardian. Informed patient would have to discuss situation due to his authority. Griffin disclosed that his daughter had been on medication since she was a child. She has been without medication or seeing the therapist for a period of time. Reassured him she was safe and explained therapist worked alongside  In order to provide care due to case load MD couldn't talk to everyone. Placed document in folder seen in notes meeting to be scheduled with family members in order to provide care hoping to find a potential therapy strategy. Father Griffin # [phone] Mother Halley Dubose # [phone] Mother contacted this RN wanted Samantha to know daughter was with her safe and feel free to contact either parent  
Relaxed

## 2024-01-08 NOTE — PLAN OF CARE
Goal Outcome Evaluation:  Plan of Care Reviewed With: patient  Patient Agreement with Plan of Care: agrees     Progress: no change  Outcome Evaluation: Patient rates anx 1 dep 0 Denies Si/HI/AVH reports having adequate sleeping and eating routine. Asked if she had any burning during urinations which she denied

## 2024-01-08 NOTE — DISCHARGE INSTR - APPOINTMENTS
[facility]  [address] [place] [id number]  [phone] - phone  [phone] - fax    January 12 2024 at 12:00pm with Magda.
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
## 2024-01-08 NOTE — PLAN OF CARE
Goal Outcome Evaluation:  Plan of Care Reviewed With: patient  Patient Agreement with Plan of Care: agrees        Outcome Evaluation: Patient was calm and cooperative during assessment.  She reported that her appetite was okay and she isn't sleeping good.  She rated her anxiety as 6/10 and her depression as 3/10.  She denied SI/AVH but would not answer HI question.  She spent the majority of the evening in the dayroom coloring and interacting with her peers.
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
## 2024-01-09 PROCEDURE — 99232 SBSQ HOSP IP/OBS MODERATE 35: CPT | Performed by: PSYCHIATRY & NEUROLOGY

## 2024-01-09 RX ADMIN — NITROFURANTOIN MONOHYDRATE/MACROCRYSTALLINE 100 MG: 25; 75 CAPSULE ORAL at 21:46

## 2024-01-09 RX ADMIN — LAMOTRIGINE 25 MG: 100 TABLET ORAL at 08:05

## 2024-01-09 RX ADMIN — HYDROXYZINE HYDROCHLORIDE 50 MG: 50 TABLET ORAL at 21:46

## 2024-01-09 RX ADMIN — HYDROXYZINE HYDROCHLORIDE 50 MG: 50 TABLET ORAL at 15:13

## 2024-01-09 RX ADMIN — NITROFURANTOIN MONOHYDRATE/MACROCRYSTALLINE 100 MG: 25; 75 CAPSULE ORAL at 08:05

## 2024-01-09 NOTE — PROGRESS NOTES
"INPATIENT PSYCHIATRIC PROGRESS NOTE    Name:  Samantha Dubose  :  2000  MRN:  [id number]  Visit Number:  [id number]  Length of stay:  2    SUBJECTIVE    CC/Focus of Exam: SI, PTSD    INTERVAL HISTORY:  The patient states she is feeling better and is hoping to go home. Has not been able to talk to her mother yet but is hoping she will be able to get in touch with her today and if all goes well she may be discharged.   Depression rating 0/10  Anxiety rating 0/10  Sleep: good  Withdrawal sx: None reported  Cravin/10    Review of Systems   HENT: Negative.     Respiratory: Negative.     Cardiovascular: Negative.    Gastrointestinal: Negative.        OBJECTIVE    Temp:  [98.1 °F (36.7 °C)-98.4 °F (36.9 °C)] 98.4 °F (36.9 °C)  Heart Rate:  [82-97] 82  Resp:  [18] 18  BP: (108-109)/(65-67) 109/67    MENTAL STATUS EXAM:  Appearance:Casually dressed, good hygeine.   Cooperation:Cooperative  Psychomotor: No psychomotor agitation/retardation, No EPS, No motor tics  Speech-normal rate, amount.  Mood \"good\"   Affect-congruent, appropriate, stable  Thought Content-goal directed, no delusional material present  Thought process-linear, organized.  Suicidality: No SI  Homicidality: No HI  Perception: No AH/VH  Insight-fair   Judgement-fair    Lab Results (last 24 hours)       ** No results found for the last 24 hours. **               Imaging Results (Last 24 Hours)       ** No results found for the last 24 hours. **               ECG/EMG Results (most recent)       Procedure Component Value Units Date/Time    ECG 12 Lead Other; Baseline Cardiac Status [[id number]] Collected: 24 1155     Updated: 24 1350     QT Interval 434 ms      QTC Interval 458 ms     Narrative:      Test Reason : Other~  Blood Pressure :   */*   mmHG  Vent. Rate :  67 BPM     Atrial Rate :  67 BPM     P-R Int : 176 ms          QRS Dur :  90 ms      QT Int : 434 ms       P-R-T Axes :  70  21  36 degrees     QTc Int : 458 ms    Normal " sinus rhythm  Anterior infarct , age undetermined  Abnormal ECG  When compared with ECG of 07-JAN-2019 15:32,  No significant change was found  Confirmed by Renetta Santos ([id number]) on 1/7/2024 1:49:32 PM    Referred By: [facility]           Confirmed By: Renetta Santos             ALLERGIES: Patient has no known allergies.    Medication Review:   Scheduled Medications:  lamoTRIgine, 25 mg, Oral, Daily  nitrofurantoin (macrocrystal-monohydrate), 100 mg, Oral, Q12H         PRN Medications:    acetaminophen    aluminum-magnesium hydroxide-simethicone    benzonatate    benztropine **OR** benztropine    famotidine    hydrOXYzine    ibuprofen    loperamide    magnesium hydroxide    ondansetron    sodium chloride    traZODone   All medications reviewed.    ASSESSMENT & PLAN:    Suicidal Ideation  -SP3    PTSD  -Continue lamotrigine 25 mg bid, patient reports she feels better after lowering the dose.    Seizure disorder  -The patient reports she took one dose of Keppra and her blood pressure dropped and she doesn't want to take it. She reports she feels fine on lamotrigine.    UTI  -Continue Macrobid    The patient would like to go home and agreed to have a family session with her mother prior to discharge.    Special precautions: Special Precautions Level 3 (q15 min checks) .    Behavioral Health Treatment Plan and Problem List: I have reviewed and approved the Behavioral Health Treatment Plan and Problem list.  The patient has had a chance to review and agrees with the treatment plan.    Copied text in portions of this note has been reviewed and is accurate as of 01/09/24         Clinician:  Ines William MD  01/09/24  10:52 EST

## 2024-01-09 NOTE — PLAN OF CARE
"Goal Outcome Evaluation:  Plan of Care Reviewed With: patient  Patient Agreement with Plan of Care: agrees     Progress: improving  Outcome Evaluation: Pt cooperative with staff. Pt attended groups and interacted appropriately with peers. Pt said appetite fair and sleep \"too excited to sleep\"; said unable to go to sleep or stay asleep. Pt denies anxiety and depression. Pt also denies SI/HI and AVH.                               "

## 2024-01-09 NOTE — PLAN OF CARE
Goal Outcome Evaluation:  Plan of Care Reviewed With: patient, guardian  Patient Agreement with Plan of Care: agrees  Consent Given to Review Plan with: Mother/Halley Dubose.  Progress: improving  Outcome Evaluation: Therapist met with Patient one-on-one.      Problem: Adult Behavioral Health Plan of Care  Goal: Plan of Care Review  Outcome: Ongoing, Progressing  Flowsheets  Taken 1/9/2024 1502  Progress: improving  Plan of Care Reviewed With:   patient   guardian  Patient Agreement with Plan of Care: agrees  Outcome Evaluation: Therapist met with Patient one-on-one.  Taken 1/8/2024 1309  Consent Given to Review Plan with: Mother/Halley Dubose.  Goal: Optimized Coping Skills in Response to Life Stressors  Outcome: Ongoing, Progressing  Flowsheets (Taken 1/9/2024 1502)  Optimized Coping Skills in Response to Life Stressors: making progress toward outcome  Intervention: Promote Effective Coping Strategies  Flowsheets (Taken 1/9/2024 1502)  Supportive Measures:   active listening utilized   counseling provided   decision-making supported   positive reinforcement provided   verbalization of feelings encouraged  Goal: Develops/Participates in Therapeutic [place] to Support Successful Transition  Outcome: Ongoing, Progressing  Flowsheets (Taken 1/9/2024 1502)  Develops/Participates in Therapeutic [place] to Support Successful Transition: making progress toward outcome  Intervention: Foster Therapeutic [place]  Flowsheets (Taken 1/9/2024 1502)  Trust Relationship/Rapport:   care explained   questions answered   choices provided   questions encouraged   emotional support provided   reassurance provided   thoughts/feelings acknowledged   empathic listening provided  Intervention: Mutually Develop Transition Plan  Flowsheets (Taken 1/9/2024 1502)  Transition Support:   community resources reviewed   follow-up care coordinated   follow-up care discussed   crisis management plan promoted   crisis management plan  verbalized    DATA: Therapist met with Patient individually this date. Patient agreeable to discuss current treatment progress and discharge concerns.     Therapist and Patient spoke with her mother, Halley, together on the phone. Halley informed Patient she went and obtained temporary custody of Patient's 5-month-old daughter while she has been here in the hospital. Patient did not take this news very well and became very upset. Patient wants to stay with her mother because she wants to be near her daughter. Mother was agreeable to this plan but wanted to make sure she was aware that they were going to have to get along in order for her to stay there. Patient and her mother had difficulty getting along and coming to an agreement.     Mother provided the number to their current [facility] , Vita Barreto, [phone]. Therapist has attempted contact with Vita several times to discuss the case, no answer. Left a voicemail requesting a returned call.     Staffed case with Dr. William.     1611:    Therapist spoke with Patient's mother, Halley again on this date. She states she was worried about Patient's 5-month-old daughter's wellbeing. She states Patient has not been taking the infant to her doctor's appointments and that her living conditions are poor. Halley states the court granted her emergency custody yesterday until Patient can get back on her feet. She states she will not be coming to pick Patient up at discharge stating she doesn't feel like her staying with her is a good idea at this time.     She states Patient has a history of mental illness as an adolescent and always struggled with being compliant with therapy and medications.     She reports Patient and her significant other have a toxic relationship and are often getting into arguments.      CLINICAL MANUVERING/INTERVENTIONS:  Assisted Patient in processing session content; acknowledged and normalized Patient’s thoughts, feelings, and  concerns by utilizing a person-centered approach in efforts to build appropriate rapport and a positive therapeutic relationship with open and honest communication. Allowed Patient to ventilate regarding current stressors and triggers for negative emotions and thoughts in a safe nonjudgmental environment with unconditional positive regard, active listening skills, and empathy.     ASSESSMENT: Patient was seen today for a follow-up. She reports improvement in mood and anxiety and was initially hopeful to be discharged until she was informed her mother obtained emergency custody of her daughter while she has been admitted to the hospital. Patient was very upset and tearful. She states she is afraid she may do something if she goes home this upset, and has decided she would like to stay another day to process what is going on.  Patient was able to eventually calm and down and talk through things appropriately. Patient states she plans to have her stepfather come pick her up at discharge as she does not feel like she get along with her mother at this time.    PLAN: Patient will continue stabilization. Patient will continue to receive services offered by Treatment Team.     Patient will follow-up with GRACIELA Ruelas.     Assistance with transportation will not be needed. Family member will provide.

## 2024-01-10 VITALS
RESPIRATION RATE: 18 BRPM | BODY MASS INDEX: 17.13 KG/M2 | OXYGEN SATURATION: 98 % | TEMPERATURE: 97.7 F | WEIGHT: 102.8 LBS | DIASTOLIC BLOOD PRESSURE: 55 MMHG | HEIGHT: 65 IN | SYSTOLIC BLOOD PRESSURE: 107 MMHG | HEART RATE: 97 BPM

## 2024-01-10 PROCEDURE — 99239 HOSP IP/OBS DSCHRG MGMT >30: CPT | Performed by: PSYCHIATRY & NEUROLOGY

## 2024-01-10 RX ORDER — LAMOTRIGINE 25 MG/1
25 TABLET ORAL DAILY
Qty: 60 TABLET | Refills: 0 | Status: SHIPPED | OUTPATIENT
Start: 2024-01-11

## 2024-01-10 RX ORDER — NITROFURANTOIN 25; 75 MG/1; MG/1
100 CAPSULE ORAL EVERY 12 HOURS SCHEDULED
Qty: 3 CAPSULE | Refills: 0 | Status: SHIPPED | OUTPATIENT
Start: 2024-01-10 | End: 2024-01-12

## 2024-01-10 RX ADMIN — NITROFURANTOIN MONOHYDRATE/MACROCRYSTALLINE 100 MG: 25; 75 CAPSULE ORAL at 08:03

## 2024-01-10 RX ADMIN — LAMOTRIGINE 25 MG: 100 TABLET ORAL at 08:03

## 2024-01-10 NOTE — PROGRESS NOTES
Psychiatry/Social Work    Patient Name:  Samantha Dubose  YOB: 2000  MRN: [id number]  Admit Date:  1/7/2024    Staffed with Dr. William. Patient is being discharged home on this date.     Therapist brought Patient in an office and allowed her to be present on today's court hearing for custody of her 5-month-old daughter. Patient was calm and appropriate while speaking with the .     Therapist spoke with [facility], per Vita Barreto, who states they are placing the child in temporary custody of Patient's mother until they are able to further investigate the situation. She states they plan to put supervised visitation in place. Made [facility] aware Patient is being discharged home on this date. Vita states they will follow-up with Patient in her home. Provided Patient with the name of number of the [facility] .     Patient adamantly and convincingly denies SI/HI/AVH and expresses readiness to return home.     Aftercare is scheduled with GRACIELA Ruelas.    Electronically signed by:  ERIN Mejias  01/10/24 14:27 EST

## 2024-01-10 NOTE — PLAN OF CARE
Goal Outcome Evaluation:  Plan of Care Reviewed With: patient  Patient Agreement with Plan of Care: agrees     Progress: improving  Outcome Evaluation: Pt reports depression 5/10. Pt denies anxiety, SI/HI/AVH. Pt reports eating and sleeping well.

## 2024-01-10 NOTE — PLAN OF CARE
Goal Outcome Evaluation:  Plan of Care Reviewed With: patient  Patient Agreement with Plan of Care: agrees     Progress: improving  Outcome Evaluation: Patient calm and cooperative. Denies anxiety, depression, SI/HI or AVH. Patient reports that she is hopeful and ready to go home. Patient is being discharged home today.

## 2024-01-10 NOTE — DISCHARGE SUMMARY
":  2000  MRN:  [id number]  Visit Number:  [id number]      Date of Admission:2024   Date of Discharge:  1/10/2024    Discharge Diagnosis:  Active Problems:    Suicidal ideation    PTSD (post-traumatic stress disorder)    Seizure        Admission Diagnosis:  MDD (major depressive disorder) [F32.9]     HPI  Patient was admitted on the unit on 2024 and was evaluated on 24   23 y.o. female presented to ER with family voicing concern for her safety and SI.   For details please see H&P dated 24.     Hospital Course  Patient is a 23 y.o. female presented with concern for her safety and suicidal ideations. She was brought to the hospital by her mother and brother after she had an argument with her fiance and mother got concerned when patient didn't answer her phone. The patient was admitted to the [facility] unit for safety, further evaluation and treatment.  The patient was started on lamotrigine 25 mg bid for her report of PTSD symptoms.. She also has a history of seizure but she didn't want to take Keppra as it made her blood pressure drop. The patient denied or minimized her problems and reported she never made any threats to harm self or others. Her mother had taken custody of patient's 5 months old baby girl and patient was not happy about it and at first she was upset but eventually she calmed down and reported a plan to approach court to get the custody back.   The patient was also able to take part in individual and group counseling sessions and work on appropriate coping skills.  The patient made steady improvement in her mood and expressed feeling more positive and hopeful about future. Sleep and appetite were improved.  The day of discharge the patient was calm, cooperative and pleasant. Mood was reported to be good, and denied SI/HI/AVH. Also reported no medication side effects.  .      Mental Status Exam upon discharge:   Mood \"good\"   Affect-congruent, appropriate, " stable  Thought Content-goal directed, no delusional material present  Thought process-linear, organized.  Suicidality: No SI  Homicidality: No HI  Perception: No AH/VH    Procedures Performed         Consults:   Consults       No orders found from 12/9/2023 to 1/8/2024.            Pertinent Test Results:   Admission on 01/07/2024   Component Date Value Ref Range Status    Hepatitis B Surface Ag 01/07/2024 Non-Reactive  Non-Reactive Final    Hep A IgM 01/07/2024 Non-Reactive  Non-Reactive Final    Hep B C IgM 01/07/2024 Non-Reactive  Non-Reactive Final    Hepatitis C Ab 01/07/2024 Non-Reactive  Non-Reactive Final    QT Interval 01/07/2024 434  ms Final    QTC Interval 01/07/2024 458  ms Final    Glucose 01/08/2024 116  70 - 130 mg/dL Final   Admission on 01/07/2024, Discharged on 01/07/2024   Component Date Value Ref Range Status    HCG Qualitative 01/07/2024 Negative  Negative Final    Glucose 01/07/2024 98  65 - 99 mg/dL Final    BUN 01/07/2024 13  6 - 20 mg/dL Final    Creatinine 01/07/2024 0.78  0.57 - 1.00 mg/dL Final    Sodium 01/07/2024 139  136 - 145 mmol/L Final    Potassium 01/07/2024 4.0  3.5 - 5.2 mmol/L Final    Chloride 01/07/2024 108 (H)  98 - 107 mmol/L Final    CO2 01/07/2024 20.4 (L)  22.0 - 29.0 mmol/L Final    Calcium 01/07/2024 9.0  8.6 - 10.5 mg/dL Final    Total Protein 01/07/2024 7.3  6.0 - 8.5 g/dL Final    Albumin 01/07/2024 4.5  3.5 - 5.2 g/dL Final    ALT (SGPT) 01/07/2024 6  1 - 33 U/L Final    AST (SGOT) 01/07/2024 12  1 - 32 U/L Final    Alkaline Phosphatase 01/07/2024 46  39 - 117 U/L Final    Total Bilirubin 01/07/2024 0.5  0.0 - 1.2 mg/dL Final    Globulin 01/07/2024 2.8  gm/dL Final    A/G Ratio 01/07/2024 1.6  g/dL Final    BUN/Creatinine Ratio 01/07/2024 16.7  7.0 - 25.0 Final    Anion Gap 01/07/2024 10.6  5.0 - 15.0 mmol/L Final    eGFR 01/07/2024 109.6  >60.0 mL/min/1.73 Final    Color, UA 01/07/2024 Yellow  Yellow, Straw Final    Appearance, UA 01/07/2024 Clear  Clear Final     pH, UA 01/07/2024 <=5.0  5.0 - 8.0 Final    Specific Gravity, UA 01/07/2024 >1.030 (H)  1.005 - 1.030 Final    Glucose, UA 01/07/2024 Negative  Negative Final    Ketones, UA 01/07/2024 15 mg/dL (1+) (A)  Negative Final    Bilirubin, UA 01/07/2024 Negative  Negative Final    Blood, UA 01/07/2024 Negative  Negative Final    Protein, UA 01/07/2024 Trace (A)  Negative Final    Leuk Esterase, UA 01/07/2024 Moderate (2+) (A)  Negative Final    Nitrite, UA 01/07/2024 Negative  Negative Final    Urobilinogen, UA 01/07/2024 0.2 E.U./dL  0.2 - 1.0 E.U./dL Final    THC, Screen, Urine 01/07/2024 Negative  Negative Final    Phencyclidine (PCP), Urine 01/07/2024 Negative  Negative Final    Cocaine Screen, Urine 01/07/2024 Negative  Negative Final    Methamphetamine, Ur 01/07/2024 Negative  Negative Final    Opiate Screen 01/07/2024 Negative  Negative Final    Amphetamine Screen, Urine 01/07/2024 Negative  Negative Final    Benzodiazepine Screen, Urine 01/07/2024 Negative  Negative Final    Tricyclic Antidepressants Screen 01/07/2024 Negative  Negative Final    Methadone Screen, Urine 01/07/2024 Negative  Negative Final    Barbiturates Screen, Urine 01/07/2024 Negative  Negative Final    Oxycodone Screen, Urine 01/07/2024 Negative  Negative Final    Buprenorphine, Screen, Urine 01/07/2024 Negative  Negative Final    Ethanol 01/07/2024 <10  0 - 10 mg/dL Final    Ethanol % 01/07/2024 <0.010  % Final    Magnesium 01/07/2024 1.8  1.6 - 2.6 mg/dL Final    WBC 01/07/2024 10.16  3.40 - 10.80 10*3/mm3 Final    RBC 01/07/2024 4.23  3.77 - 5.28 10*6/mm3 Final    Hemoglobin 01/07/2024 11.3 (L)  12.0 - 15.9 g/dL Final    Hematocrit 01/07/2024 37.3  34.0 - 46.6 % Final    MCV 01/07/2024 88.2  79.0 - 97.0 fL Final    MCH 01/07/2024 26.7  26.6 - 33.0 pg Final    MCHC 01/07/2024 30.3 (L)  31.5 - 35.7 g/dL Final    RDW 01/07/2024 14.7  12.3 - 15.4 % Final    RDW-SD 01/07/2024 47.0  37.0 - 54.0 fl Final    MPV 01/07/2024 10.2  6.0 - 12.0 fL Final     Platelets 01/07/2024 215  140 - 450 10*3/mm3 Final    Neutrophil % 01/07/2024 68.3  42.7 - 76.0 % Final    Lymphocyte % 01/07/2024 23.3  19.6 - 45.3 % Final    Monocyte % 01/07/2024 6.9  5.0 - 12.0 % Final    Eosinophil % 01/07/2024 0.4  0.3 - 6.2 % Final    Basophil % 01/07/2024 0.7  0.0 - 1.5 % Final    Immature Grans % 01/07/2024 0.4  0.0 - 0.5 % Final    Neutrophils, Absolute 01/07/2024 6.94  1.70 - 7.00 10*3/mm3 Final    Lymphocytes, Absolute 01/07/2024 2.37  0.70 - 3.10 10*3/mm3 Final    Monocytes, Absolute 01/07/2024 0.70  0.10 - 0.90 10*3/mm3 Final    Eosinophils, Absolute 01/07/2024 0.04  0.00 - 0.40 10*3/mm3 Final    Basophils, Absolute 01/07/2024 0.07  0.00 - 0.20 10*3/mm3 Final    Immature Grans, Absolute 01/07/2024 0.04  0.00 - 0.05 10*3/mm3 Final    nRBC 01/07/2024 0.0  0.0 - 0.2 /100 WBC Final    Fentanyl, Urine 01/07/2024 Negative  Negative Final    RBC, UA 01/07/2024 0-2  None Seen, 0-2 /HPF Final    WBC, UA 01/07/2024 11-20 (A)  None Seen, 0-2 /HPF Final    Bacteria, UA 01/07/2024 1+ (A)  None Seen /HPF Final    Squamous Epithelial Cells, UA 01/07/2024 7-12 (A)  None Seen, 0-2 /HPF Final    Hyaline Casts, UA 01/07/2024 None Seen  None Seen /LPF Final    Mucus, UA 01/07/2024 Large/3+ (A)  None Seen, Trace /HPF Final    Methodology 01/07/2024 Manual Light Microscopy   Final    Extra Tube 01/07/2024 Hold for add-ons.   Final    Auto resulted.        Condition on Discharge:  improved    Vital Signs  Temp:  [97.7 °F (36.5 °C)-98.4 °F (36.9 °C)] 97.7 °F (36.5 °C)  Heart Rate:  [] 97  Resp:  [18] 18  BP: (107-108)/(55-65) 107/55      Discharge Disposition:  Home or Self Care    Discharge Medications:     Discharge Medications        New Medications        Instructions Start Date   lamoTRIgine 25 MG tablet  Commonly known as: LaMICtal   25 mg, Oral, Daily   Start Date: January 11, 2024     nitrofurantoin (macrocrystal-monohydrate) 100 MG capsule  Commonly known as: MACROBID   100 mg, Oral, Every  12 Hours Scheduled               Discharge Diet: Regular    Activity at Discharge: As tolerated     Follow-up Appointments  [facility] in [place]      Time: I spent  > 30 mmin  minutes on this discharge activity which included: face-to-face encounter with the patient, reviewing the data in the system, coordination of the care with the nursing staff as well as consultants, documentation, and entering orders.        Clinician:   Ines William MD  01/10/24  10:25 EST

## 2024-01-10 NOTE — PROGRESS NOTES
Behavioral Health Discharge Summary             Please fax within 24 hours of discharge to [facility] at: [phone]      Member Name: Samantha BAKER  Member ID: [id number]   Authorization Number: [id number] Phone: [phone]   Member Address: [address] [place] [id number]   Discharge Date: 01/10/2024 Level of Care at Discharge:    Facility: [facility] Staff Completing Form: Iva NOEL   If the member is being discharged directly to a residential or extended care program, please specify the type below.   __Private Child-Caring Facility (PCC) Residential/Group Home   __Private Child-Caring Facility (PCC) Therapeutic Foster Care   __Residential Treatment Facility (RTF)   __Psychiatric Residential Treatment Facility (PRTF I or II)   __Long-Term Acute Inpatient Hospital Services or Extended Care Unit (ECU)   __Other (please specify):    Brief discharge summary of treatment received (for follow up by the case management team): D/C clinical with list of medications and follow up appts given to patient upon discharge.     BRIEF SUMMARY OF RECOMMENDATIONS FOR ONGOING TREATMENT     Discharged to where: HOME   Discharge diagnoses: F 32.9   Axis I:    Axis II:    Axis III:    Axis IV:    Axis V:    Does the member understand his/her DX?  Yes          Medication     Dose     Schedule Supply/  Quantity  Given at Discharge RX Provided  Yes/No  If Rx Provided, Quantity RX Prior Auth Required  Yes/No Prior Auth  Completed   LaMICtal  25 mg  ! Tab PO Daily                                                                                         Does the member understand the reason for taking these medications? Yes                                                           FOLLOW-UP APPOINTMENTS   Please schedule within 7 days of discharge and provide appointment details for all referred services.    PCP/Other Providers Involved in Treatment:    Appointment  Type: [facility]    Provider Name:   [place]   Provider Phone:   [phone] Appointment Date: 01/12/2024 Appointment Time:   12:00 PM with Magda Aguillon   (new to OP services)        Case Management    Is the member already enrolled in case management?  Yes/No  If yes, date the CM was notified:    If no, was the CM referral offered?  Yes/No  Accepted? Yes/No    Is the Release of Information in the chart? Yes/No:      Medication Management (for member discharged with psychiatric medications):      A&D Treatment (for member with substance abuse/   dependence in the past year):      Medical Condition (for member with a medical condition):    Other recommended treatment:    Do you have any concerns about the discharge plan?  No    If yes, explain:    Was the member involved in the discharge planning?  Yes    If no, explain:    Was a copy of the discharge plan provided to the member?  Yes    If no, explain:

## 2024-07-26 ENCOUNTER — TELEPHONE (OUTPATIENT)
Dept: OBSTETRICS AND GYNECOLOGY | Facility: CLINIC | Age: 24
End: 2024-07-26
Payer: COMMERCIAL

## 2024-07-26 NOTE — TELEPHONE ENCOUNTER
Patient is asking for appointment/transfer 32 weeks sees Dr Justin Mccartney [place].  History risk  delivery was told her cervix is open.    She states she spoke with Dr Fitch last month about being seen while her mom Halley Dubose (1979) had appointment with her in office.  She states Dr Fitch stated she would see her.  Patient wants to schedule appointment

## 2024-07-26 NOTE — TELEPHONE ENCOUNTER
COURT    THIS NEW OB PT SAYS SHE IS OVER 32WKS AND NEEDS TO SEE DR YUN. PT EXPLAINED TO THAT DR YUN IS NEXT AVAILABLE. SHE MENTIONED SHE IS HIGH RISK DUE TO EARLY DELIVERY OF PREVIOUS PREGNANCY AND THAT HER CERVIX IS OPENED.

## 2024-08-15 ENCOUNTER — TELEPHONE (OUTPATIENT)
Dept: OBSTETRICS AND GYNECOLOGY | Facility: CLINIC | Age: 24
End: 2024-08-15
Payer: COMMERCIAL

## 2024-08-15 NOTE — TELEPHONE ENCOUNTER
"Caller: Samantha Dubose \"Domenica\"    Relationship to patient: Self    Best call back number: [phone], OK TO LEAVE      Chief complaint: PATIENT IS NEW OB 35 WEEKS PREG. MEDICAL RECS HAVE BEEN SENT OVER. PT IS WANTING TO BE SEEN BY COURT CHILDRESS.     Type of visit: NEW OB    Requested date: ASAP         "

## 2024-08-16 NOTE — TELEPHONE ENCOUNTER
Spoke with patient's mother Halley and advised her that all we received were Domenica's CBC and glucola and that Dr. Fitch needs all of her prenatal labs and records.  Her mother stated that she has possibly been leaking fluid and having intermittent contractions.  I have advised her of labor stephens precautions due to the problems she has been having.

## 2024-08-16 NOTE — TELEPHONE ENCOUNTER
The records are on my desk but there is only a CBC and glucola. Are we able to get her prenatal labs?